# Patient Record
Sex: MALE | Race: WHITE | NOT HISPANIC OR LATINO | Employment: OTHER | URBAN - METROPOLITAN AREA
[De-identification: names, ages, dates, MRNs, and addresses within clinical notes are randomized per-mention and may not be internally consistent; named-entity substitution may affect disease eponyms.]

---

## 2017-05-11 ENCOUNTER — TRANSFERRED RECORDS (OUTPATIENT)
Dept: HEALTH INFORMATION MANAGEMENT | Facility: CLINIC | Age: 47
End: 2017-05-11

## 2017-05-11 LAB
ALT SERPL-CCNC: 44 IU/L (ref 5–40)
AST SERPL-CCNC: 32 U/L (ref 5–34)
CREAT SERPL-MCNC: 0.94 MG/DL (ref 0.5–1.3)
GFR SERPL CREATININE-BSD FRML MDRD: 91.8 ML/MIN/1.73M2

## 2018-02-07 ENCOUNTER — OFFICE VISIT (OUTPATIENT)
Dept: FAMILY MEDICINE | Facility: CLINIC | Age: 48
End: 2018-02-07
Payer: COMMERCIAL

## 2018-02-07 VITALS
WEIGHT: 203.5 LBS | HEART RATE: 76 BPM | BODY MASS INDEX: 26.97 KG/M2 | DIASTOLIC BLOOD PRESSURE: 82 MMHG | SYSTOLIC BLOOD PRESSURE: 120 MMHG | TEMPERATURE: 98 F | OXYGEN SATURATION: 98 % | HEIGHT: 73 IN

## 2018-02-07 DIAGNOSIS — Z00.00 ROUTINE GENERAL MEDICAL EXAMINATION AT A HEALTH CARE FACILITY: Primary | ICD-10-CM

## 2018-02-07 DIAGNOSIS — M1A.9XX1 CHRONIC GOUT WITH TOPHUS, UNSPECIFIED CAUSE, UNSPECIFIED SITE: ICD-10-CM

## 2018-02-07 DIAGNOSIS — Z12.5 PROSTATE CANCER SCREENING: ICD-10-CM

## 2018-02-07 DIAGNOSIS — R09.81 CHRONIC NASAL CONGESTION: ICD-10-CM

## 2018-02-07 LAB
ERYTHROCYTE [DISTWIDTH] IN BLOOD BY AUTOMATED COUNT: 14.9 % (ref 10–15)
HCT VFR BLD AUTO: 43.3 % (ref 40–53)
HGB BLD-MCNC: 14.7 G/DL (ref 13.3–17.7)
MCH RBC QN AUTO: 28.5 PG (ref 26.5–33)
MCHC RBC AUTO-ENTMCNC: 33.9 G/DL (ref 31.5–36.5)
MCV RBC AUTO: 84 FL (ref 78–100)
PLATELET # BLD AUTO: 370 10E9/L (ref 150–450)
RBC # BLD AUTO: 5.15 10E12/L (ref 4.4–5.9)
WBC # BLD AUTO: 5.6 10E9/L (ref 4–11)

## 2018-02-07 PROCEDURE — 84550 ASSAY OF BLOOD/URIC ACID: CPT | Performed by: INTERNAL MEDICINE

## 2018-02-07 PROCEDURE — 80053 COMPREHEN METABOLIC PANEL: CPT | Performed by: INTERNAL MEDICINE

## 2018-02-07 PROCEDURE — G0103 PSA SCREENING: HCPCS | Performed by: INTERNAL MEDICINE

## 2018-02-07 PROCEDURE — 36415 COLL VENOUS BLD VENIPUNCTURE: CPT | Performed by: INTERNAL MEDICINE

## 2018-02-07 PROCEDURE — 80061 LIPID PANEL: CPT | Performed by: INTERNAL MEDICINE

## 2018-02-07 PROCEDURE — 99396 PREV VISIT EST AGE 40-64: CPT | Performed by: INTERNAL MEDICINE

## 2018-02-07 PROCEDURE — 85027 COMPLETE CBC AUTOMATED: CPT | Performed by: INTERNAL MEDICINE

## 2018-02-07 RX ORDER — ALLOPURINOL 300 MG/1
450 TABLET ORAL DAILY
COMMUNITY
End: 2020-06-12

## 2018-02-07 RX ORDER — ASCORBIC ACID 500 MG
TABLET ORAL DAILY
COMMUNITY
End: 2021-01-13

## 2018-02-07 RX ORDER — CETIRIZINE HYDROCHLORIDE, PSEUDOEPHEDRINE HYDROCHLORIDE 5; 120 MG/1; MG/1
1 TABLET, FILM COATED, EXTENDED RELEASE ORAL 2 TIMES DAILY
COMMUNITY
End: 2019-07-25

## 2018-02-07 NOTE — MR AVS SNAPSHOT
After Visit Summary   2/7/2018    Jamar Reina    MRN: [id number]           Patient Information     Date Of Birth          1970        Visit Information        Provider Department      2/7/2018 12:30 PM Shiva Pace MD [facility]        Today's Diagnoses     Routine general medical examination at a health care facility    -  1    Chronic gout with tophus, unspecified cause, unspecified site        Prostate cancer screening        Chronic nasal congestion          Care Instructions      Preventive Health Recommendations  Male Ages 40 to 49    Yearly exam:             See your health care provider every year in order to  o   Review health changes.   o   Discuss preventive care.    o   Review your medicines if your doctor has prescribed any.    You should be tested each year for STDs (sexually transmitted diseases) if you re at risk.     Have a cholesterol test every 5 years.     Have a colonoscopy (test for colon cancer) if someone in your family has had colon cancer or polyps before age 50.     After age 45, have a diabetes test (fasting glucose). If you are at risk for diabetes, you should have this test every 3 years.      Talk with your health care provider about whether or not a prostate cancer screening test (PSA) is right for you.    Shots: Get a flu shot each year. Get a tetanus shot every 10 years.     Nutrition:    Eat at least 5 servings of fruits and vegetables daily.     Eat whole-grain bread, whole-wheat pasta and brown rice instead of white grains and rice.     Talk to your provider about Calcium and Vitamin D.     Lifestyle    Exercise for at least 150 minutes a week (30 minutes a day, 5 days a week). This will help you control your weight and prevent disease.     Limit alcohol to one drink per day.     No smoking.     Wear sunscreen to prevent skin cancer.     See your dentist every six months for an exam and cleaning.              Follow-ups after your  "visit        Who to contact     If you have questions or need follow up information about today's clinic visit or your schedule please contact [facility] directly at [phone].  Normal or non-critical lab and imaging results will be communicated to you by MyChart, letter or phone within 4 business days after the clinic has received the results. If you do not hear from us within 7 days, please contact the clinic through Money Mover[employer] or phone. If you have a critical or abnormal lab result, we will notify you by phone as soon as possible.  Submit refill requests through Percolate or call your pharmacy and they will forward the refill request to us. Please allow 3 business days for your refill to be completed.          Additional Information About Your Visit        Money [email or web address] Information     Percolate gives you secure access to your electronic health record. If you see a primary care provider, you can also send messages to your care team and make appointments. If you have questions, please call your primary care clinic.  If you do not have a primary care provider, please call [phone] and they will assist you.        Care EveryWhere ID     This is your Care EveryWhere ID. This could be used by other organizations to access your [place] medical records  [id number]        Your Vitals Were     Pulse Temperature Height Pulse Oximetry BMI (Body Mass Index)       76 98  F (36.7  C) (Oral) 6' 1\" (1.854 m) 98% 26.85 kg/m2        Blood Pressure from Last 3 Encounters:   02/07/18 120/82   10/24/16 118/81   08/25/16 125/84    Weight from Last 3 Encounters:   02/07/18 203 lb 8 oz (92.3 kg)   10/24/16 201 lb (91.2 kg)   08/25/16 200 lb (90.7 kg)              We Performed the Following     CBC with platelets     Comprehensive metabolic panel     Lipid panel reflex to direct LDL Non-fasting     Prostate spec antigen screen     Uric acid          Today's Medication Changes          These changes are accurate as of 2/7/18  " 1:07 PM.  If you have any questions, ask your nurse or doctor.               These medicines have changed or have updated prescriptions.        Dose/Directions    allopurinol 300 MG tablet   Commonly known as:  ZYLOPRIM   This may have changed:  medication strength   Used for:  Chronic gout with tophus, unspecified cause, unspecified site   Changed by:  Shiva Pace MD        Dose:  450 mg   Take 1.5 tablets (450 mg) by mouth daily   Refills:  0       cetirizine-psuedoePHEDrine 5-120 MG per 12 hr tablet   Commonly known as:  zyrTEC-D   This may have changed:    - when to take this  - reasons to take this   Used for:  Chronic nasal congestion   Changed by:  Shiva Pace MD        Dose:  1 tablet   Take 1 tablet by mouth 2 times daily   Refills:  0         Stop taking these medicines if you haven't already. Please contact your care team if you have questions.     pseudoePHEDrine 30 MG tablet   Commonly known as:  SUDAFED   Stopped by:  Shiva Pace MD           ZYRTEC ALLERGY 10 MG tablet   Generic drug:  cetirizine   Stopped by:  Shiva Pace MD                    Primary Care Provider Office Phone # Fax #    Shiva Pace [phone]       [facility] [address] [place] [id number]        Equal Access to Services     [facility] : Hadii aad ku hadasho Soomaali, waaxda luqadaha, qaybta kaalmada adeegyada, waxay jesus manuelin haysarahn evy nair . So [facility] [phone].    ATENCIÓN: Si habla español, tiene a barry disposición servicios gratuitos de asistencia lingüística. Llame al [phone].    We comply with applicable federal civil rights laws and [place] laws. We do not discriminate on the basis of race, color, national origin, age, disability, sex, sexual orientation, or gender identity.            Thank you!     Thank you for choosing [facility]  for your care. Our goal is always to provide you with excellent care. Hearing back from our  patients is one way we can continue to improve our services. Please take a few minutes to complete the written survey that you may receive in the mail after your visit with us. Thank you!             Your Updated Medication List - Protect others around you: Learn how to safely use, store and throw away your medicines at www.disposemymeds.org.          This list is accurate as of 2/7/18  1:07 PM.  Always use your most recent med list.                   Brand Name Dispense Instructions for use Diagnosis    allopurinol 300 MG tablet    ZYLOPRIM     Take 1.5 tablets (450 mg) by mouth daily    Chronic gout with tophus, unspecified cause, unspecified site       ascorbic acid 500 MG tablet    VITAMIN C     Take by mouth daily        Calcium-Magnesium-Zinc 333-133-5 MG Tabs per tablet      Take 1 tablet by mouth daily        cetirizine-psuedoePHEDrine 5-120 MG per 12 hr tablet    zyrTEC-D     Take 1 tablet by mouth 2 times daily    Chronic nasal congestion       cholecalciferol 1000 UNIT tablet    vitamin D3    100 tablet    Take 1 tablet by mouth Daily in winter        MULTIVITAMIN TABS   OR      1 TABLET DAILY        vitamin B complex with vitamin C Tabs tablet    STRESS TAB    100 tablet    Take 1 tablet by mouth daily as needed

## 2018-02-07 NOTE — PROGRESS NOTES
SUBJECTIVE:   CC: Jamar Reina is an 47 year old male who presents for preventative health visit.     Healthy Habits:    Do you get at least three servings of calcium containing foods daily (dairy, green leafy vegetables, etc.)? yes    Amount of exercise or daily activities, outside of work: 6-7 day(s) per week, will usually do some basic exercises at home every morning and also walks about 5 miles each day    Problems taking medications regularly No    Medication side effects: No    Have you had an eye exam in the past two years? yes    Do you see a dentist twice per year? yes    Do you have sleep apnea, excessive snoring or daytime drowsiness?no       Today's PHQ-2 Score:   PHQ-2 ( 1999 Pfizer) 8/25/2016   Q1: Little interest or pleasure in doing things 1   Q2: Feeling down, depressed or hopeless 0   PHQ-2 Score 1       Abuse: Current or Past(Physical, Sexual or Emotional)- No  Do you feel safe in your environment - Yes    Social History   Substance Use Topics     Smoking status: Former Smoker     Types: Cigarettes     Quit date: 4/1/2010     Smokeless tobacco: Former User     Types: Chew     Quit date: 11/1/2008      Comment: quit both 5/2010     Alcohol use 0.0 oz/week     0 Standard drinks or equivalent per week      Comment: 3 drinks per week      If you drink alcohol do you typically have >3 drinks per day or >7 drinks per week? No                      Last PSA: No results found for: PSA    Reviewed orders with patient. Reviewed health maintenance and updated orders accordingly - Yes  Patient Active Problem List   Diagnosis     Allergic rhinitis     High triglycerides     NO SHOW     Chronic gout with tophus, unspecified cause, unspecified site     Past Surgical History:   Procedure Laterality Date     C NONSPECIFIC PROCEDURE  1998    Loose body excursion removal     C NONSPECIFIC PROCEDURE  10/05    [place] teeth removed x 4     C RAD RESEC TONSIL/PILLARS       C REPAIR CRUCIATE LIGAMENT,KNEE  1991     Right knee     VASECTOMY         Social History   Substance Use Topics     Smoking status: Former Smoker     Types: Cigarettes     Quit date: 2010     Smokeless tobacco: Former User     Types: Chew     Quit date: 2008      Comment: quit both 2010     Alcohol use 0.0 oz/week     0 Standard drinks or equivalent per week      Comment: 3 drinks per week     Family History   Problem Relation Age of Onset     Obesity Mother      Hypertension Father      Arthritis Father      RA     GASTROINTESTINAL DISEASE Father      Colon polyps --- not cancer but had part of his colon removed.     Arrhythmia Father      defibrillator or pace maker in place      DIABETES Maternal Grandmother      insulin dependant     HEART DISEASE Maternal Grandfather       of MI at age 87     Cancer - colorectal Maternal Grandfather      at age 80     Allergies Brother      seasonal and animal     Allergies Sister          Current Outpatient Prescriptions   Medication Sig Dispense Refill     Calcium-Magnesium-Zinc 333-133-5 MG TABS per tablet Take 1 tablet by mouth daily       ascorbic acid (VITAMIN C) 500 MG tablet Take by mouth daily       allopurinol (ZYLOPRIM) 300 MG tablet Take 1.5 tablets (450 mg) by mouth daily       cetirizine-psuedoePHEDrine (ZYRTEC-D) 5-120 MG per 12 hr tablet Take 1 tablet by mouth 2 times daily       cholecalciferol (VITAMIN D) 1000 UNIT tablet Take 1 tablet by mouth Daily in winter 100 tablet 12     B Complex Vitamins (VITAMIN B COMPLEX) tablet Take 1 tablet by mouth daily as needed  100 tablet 12     MULTIVITAMIN TABS   OR 1 TABLET DAILY       Allergies   Allergen Reactions     Animal Dander      Flonase [Fluticasone] Other (See Comments)     Nose bleeds     Seasonal Allergies        Reviewed and updated as needed this visit by clinical staff  Tobacco  Meds  Med Hx  Surg Hx  Fam Hx  Soc Hx        Reviewed and updated as needed this visit by Provider  Tobacco  Med Hx  Surg Hx  Fam Hx  Soc Hx  "      Past Medical History:   Diagnosis Date     Allergic rhinitis      Concussion, unspecified 1991    Loss of consciencness     Gout      Other and unspecified hyperlipidemia       Past Surgical History:   Procedure Laterality Date     C NONSPECIFIC PROCEDURE  1998    Loose body excursion removal     C NONSPECIFIC PROCEDURE  10/05    [place] teeth removed x 4     C RAD RESEC TONSIL/PILLARS       C REPAIR CRUCIATE LIGAMENT,KNEE  1991    Right knee     VASECTOMY  5/06       ROS:  A 12 organ systems ROS is negative.    OBJECTIVE:   /82  Pulse 76  Temp 98  F (36.7  C) (Oral)  Ht 6' 1\" (1.854 m)  Wt 203 lb 8 oz (92.3 kg)  SpO2 98%  BMI 26.85 kg/m2  EXAM:  GENERAL: healthy, alert and no distress  EYES: Eyes grossly normal to inspection, PERRL and conjunctivae and sclerae normal  HENT: ear canals and TM's normal, nose and mouth without ulcers or lesions  NECK: no adenopathy, no asymmetry, masses, or scars and thyroid normal to palpation  RESP: lungs clear to auscultation - no rales, rhonchi or wheezes  CV: regular rate and rhythm, normal S1 S2, no S3 or S4, no murmur, click or rub, no peripheral edema and peripheral pulses strong  ABDOMEN: soft, nontender, no hepatosplenomegaly, no masses and bowel sounds normal  RECTAL: normal sphincter tone, no rectal masses, prostate normal size, smooth, nontender without nodules or masses  :  No inguinal hernias or scrotum masses   MS: no gross musculoskeletal defects noted, no edema  SKIN: no suspicious lesions or rashes  NEURO: Normal strength and tone, mentation intact and speech normal  PSYCH: mentation appears normal, affect normal/bright    ASSESSMENT/PLAN:   1. Routine general medical examination at a health care facility    - Comprehensive metabolic panel  - CBC with platelets  - Lipid panel reflex to direct LDL Non-fasting    2. Chronic gout with tophus, unspecified cause, unspecified site    - allopurinol (ZYLOPRIM) 300 MG tablet; Take 1.5 tablets (450 mg) by " "mouth daily  - Uric acid    3. Prostate cancer screening    - Prostate spec antigen screen    4. Chronic nasal congestion    - cetirizine-psuedoePHEDrine (ZYRTEC-D) 5-120 MG per 12 hr tablet; Take 1 tablet by mouth 2 times daily    COUNSELING:  Reviewed preventive health counseling, as reflected in patient instructions  Special attention given to:        Regular exercise       Healthy diet/nutrition       Immunizations       Prostate cancer screening    BP Screening:   Last 3 BP Readings:    BP Readings from Last 3 Encounters:   02/07/18 120/82   10/24/16 118/81   08/25/16 125/84       The following was recommended to the patient:  Re-screen BP within a year and recommended lifestyle modifications   reports that he quit smoking about 7 years ago. His smoking use included Cigarettes. He quit smokeless tobacco use about 9 years ago. His smokeless tobacco use included Chew.    Estimated body mass index is 26.85 kg/(m^2) as calculated from the following:    Height as of this encounter: 6' 1\" (1.854 m).    Weight as of this encounter: 203 lb 8 oz (92.3 kg).   Weight management plan: Discussed healthy diet and exercise guidelines and patient will follow up in 12 months in clinic to re-evaluate.    Counseling Resources:  ATP IV Guidelines  Pooled Cohorts Equation Calculator  FRAX Risk Assessment  ICSI Preventive Guidelines  Dietary Guidelines for Americans, 2010  USDA's MyPlate  ASA Prophylaxis  Lung CA Screening    Shiva Pace MD  [facility]  "

## 2018-02-08 LAB
ALBUMIN SERPL-MCNC: 3.9 G/DL (ref 3.4–5)
ALP SERPL-CCNC: 100 U/L (ref 40–150)
ALT SERPL W P-5'-P-CCNC: 35 U/L (ref 0–70)
ANION GAP SERPL CALCULATED.3IONS-SCNC: 9 MMOL/L (ref 3–14)
AST SERPL W P-5'-P-CCNC: 22 U/L (ref 0–45)
BILIRUB SERPL-MCNC: 0.7 MG/DL (ref 0.2–1.3)
BUN SERPL-MCNC: 15 MG/DL (ref 7–30)
CALCIUM SERPL-MCNC: 8.7 MG/DL (ref 8.5–10.1)
CHLORIDE SERPL-SCNC: 103 MMOL/L (ref 94–109)
CHOLEST SERPL-MCNC: 131 MG/DL
CO2 SERPL-SCNC: 24 MMOL/L (ref 20–32)
CREAT SERPL-MCNC: 0.97 MG/DL (ref 0.66–1.25)
GFR SERPL CREATININE-BSD FRML MDRD: 83 ML/MIN/1.7M2
GLUCOSE SERPL-MCNC: 86 MG/DL (ref 70–99)
HDLC SERPL-MCNC: 26 MG/DL
LDLC SERPL CALC-MCNC: 59 MG/DL
NONHDLC SERPL-MCNC: 105 MG/DL
POTASSIUM SERPL-SCNC: 3.7 MMOL/L (ref 3.4–5.3)
PROT SERPL-MCNC: 6.7 G/DL (ref 6.8–8.8)
PSA SERPL-ACNC: 1.92 UG/L (ref 0–4)
SODIUM SERPL-SCNC: 136 MMOL/L (ref 133–144)
TRIGL SERPL-MCNC: 231 MG/DL
URATE SERPL-MCNC: 5.6 MG/DL (ref 3.5–7.2)

## 2018-02-08 NOTE — PROGRESS NOTES
"The following letter pertains to your most recent diagnostic tests:    -Your prostate specific antigen (PSA) test result returned normal.     -Liver and gallbladder tests are normal for you. (ALT,AST, Alk phos, bilirubin), kidney function is normal for you (Creatinine, GFR), Sodium is normal, Potassium is normal for you, Calcium is normal for you, Glucose (blood sugar) is normal for you.      -Your gout blood test (uric acid) is at your goal of uric acid less than 6.  Having a uric acid less than 6 optimally prevent gout attacks.    -Your total cholesterol is 131 which is at your goal of total cholesterol less than 200.    -Your triglycerides are 231 which are above your goal of triglycerides less than 150, but you were not fasting.      -Your HDL or \"good cholesterol\" is 26 which is below your goal of HDL cholesterol greater than 40.    -Your LDL cholesterol or \"bad cholesterol\" is 59 which is at your goal of LDL cholesterol less than <160.  Your LDL goal is based on your risk factors for artery disease.     -Your complete blood counts including your hemoglobin returned normal for you.       Bottom line:  Your lab results look stable.  Reducing carbohydrates and fats in your diet, losing weight and consuming less alcohol can improve your triglyceride levels. Increasing exercise can improve HDL (\"good cholesterol\") levels.  A good target to shoot for is 30 minutes of aerobic activity at least 4 days per week. We can recheck in one year.        Follow up:  Schedule an appointment for a physical examination with fasting blood tests in one year's time, or return sooner if new questions, symptoms or problems arise.       Sincerely,    Dr. Pace"

## 2018-07-26 ENCOUNTER — OFFICE VISIT (OUTPATIENT)
Dept: FAMILY MEDICINE | Facility: CLINIC | Age: 48
End: 2018-07-26
Payer: COMMERCIAL

## 2018-07-26 VITALS
WEIGHT: 207 LBS | BODY MASS INDEX: 27.43 KG/M2 | SYSTOLIC BLOOD PRESSURE: 114 MMHG | TEMPERATURE: 98.6 F | HEART RATE: 67 BPM | HEIGHT: 73 IN | DIASTOLIC BLOOD PRESSURE: 72 MMHG | OXYGEN SATURATION: 97 %

## 2018-07-26 DIAGNOSIS — G89.29 HEEL PAIN, CHRONIC, RIGHT: ICD-10-CM

## 2018-07-26 DIAGNOSIS — M79.671 HEEL PAIN, CHRONIC, RIGHT: ICD-10-CM

## 2018-07-26 DIAGNOSIS — L29.9 PRURITIC DISORDER: ICD-10-CM

## 2018-07-26 DIAGNOSIS — A63.0 CONDYLOMA ACUMINATUM IN MALE: Primary | ICD-10-CM

## 2018-07-26 PROCEDURE — 99213 OFFICE O/P EST LOW 20 MIN: CPT | Mod: 25 | Performed by: INTERNAL MEDICINE

## 2018-07-26 PROCEDURE — 17110 DESTRUCTION B9 LES UP TO 14: CPT | Performed by: INTERNAL MEDICINE

## 2018-07-26 RX ORDER — FEXOFENADINE HCL 180 MG/1
180 TABLET ORAL 2 TIMES DAILY
COMMUNITY

## 2018-07-26 RX ORDER — IMIQUIMOD 12.5 MG/.25G
CREAM TOPICAL
Qty: 12 PACKET | Refills: 11 | Status: SHIPPED | OUTPATIENT
Start: 2018-07-26

## 2018-07-26 RX ORDER — GABAPENTIN 300 MG/1
300-600 CAPSULE ORAL
Qty: 180 CAPSULE | Refills: 3 | Status: SHIPPED | OUTPATIENT
Start: 2018-07-26 | End: 2019-07-25

## 2018-07-26 NOTE — MR AVS SNAPSHOT
"              After Visit Summary   7/26/2018    Jamar Reina    MRN: [id number]           Patient Information     Date Of Birth          1970        Visit Information        Provider Department      7/26/2018 10:30 AM Shiva Pace MD [facility]        Today's Diagnoses     Condyloma acuminatum in male    -  1    Pruritic disorder        Heel pain, chronic, right           Follow-ups after your visit        Who to contact     If you have questions or need follow up information about today's clinic visit or your schedule please contact [facility] directly at [phone].  Normal or non-critical lab and imaging results will be communicated to you by MyChart, letter or phone within 4 business days after the clinic has received the results. If you do not hear from us within 7 days, please contact the clinic through FlxOnet or phone. If you have a critical or abnormal lab result, we will notify you by phone as soon as possible.  Submit refill requests through [employer] or call your pharmacy and they will forward the refill request to us. Please allow 3 business days for your refill to be completed.          Additional Information About Your Visit        MyChart Information     [employer] gives you secure access to your electronic health record. If you see a primary care provider, you can also send messages to your care team and make appointments. If you have questions, please call your primary care clinic.  If you do not have a primary care provider, please call [phone] and they will assist you.        Care EveryWhere ID     This is your Care EveryWhere ID. This could be used by other organizations to access your [place] medical records  [id number]        Your Vitals Were     Pulse Temperature Height Pulse Oximetry BMI (Body Mass Index)       67 98.6  F (37  C) (Oral) 6' 1\" (1.854 m) 97% 27.31 kg/m2        Blood Pressure from Last 3 Encounters:   07/26/18 114/72   02/07/18 " 120/82   10/24/16 118/81    Weight from Last 3 Encounters:   07/26/18 207 lb (93.9 kg)   02/07/18 203 lb 8 oz (92.3 kg)   10/24/16 201 lb (91.2 kg)              We Performed the Following     DESTRUC BENIGN/PREMAL,1ST LESION [60657]     Treat Benign Wart or Mulloscum Contagiosum or Milia up to 14 lesions (No quantity required)          Today's Medication Changes          These changes are accurate as of 7/26/18 11:18 AM.  If you have any questions, ask your nurse or doctor.               Start taking these medicines.        Dose/Directions    gabapentin 300 MG capsule   Commonly known as:  NEURONTIN   Used for:  Pruritic disorder   Started by:  Shiva Pace MD        Dose:  300-600 mg   Take 1-2 capsules (300-600 mg) by mouth nightly as needed   Quantity:  180 capsule   Refills:  3       imiquimod 5 % cream   Commonly known as:  ALDARA   Used for:  Condyloma acuminatum in male   Started by:  Shiva Pace MD        Apply a small sized amount to warts or molluscum three times weekly at bedtime.   Wash off after 8 hours.   May use for up to 16 weeks.   Quantity:  12 packet   Refills:  11            Where to get your medicines      These medications were sent to [facility] [address], [address]  [address], [facility] [address], [facility] [address]     Phone:  [phone]     gabapentin 300 MG capsule    imiquimod 5 % cream                Primary Care Provider Office Phone # Fax #    Shiva Pace [phone] [address]  [facility] [address]        Equal Access to Services     [facility] AH: Hadii aristeo dalal Somarium, waaxda luqadaha, qaybta kaalmada ashley, amara arias. So [facility] [phone].    ATENCIÓN: Si habla español, tiene a barry disposición servicios gratuitos de asistencia lingüística. Sarah al [phone].    We comply with applicable federal civil rights laws and [place] laws. We do not  discriminate on the basis of race, color, national origin, age, disability, sex, sexual orientation, or gender identity.            Thank you!     Thank you for choosing [facility]  for your care. Our goal is always to provide you with excellent care. Hearing back from our patients is one way we can continue to improve our services. Please take a few minutes to complete the written survey that you may receive in the mail after your visit with us. Thank you!             Your Updated Medication List - Protect others around you: Learn how to safely use, store and throw away your medicines at www.disposemymeds.org.          This list is accurate as of 7/26/18 11:18 AM.  Always use your most recent med list.                   Brand Name Dispense Instructions for use Diagnosis    allopurinol 300 MG tablet    ZYLOPRIM     Take 1.5 tablets (450 mg) by mouth daily    Chronic gout with tophus, unspecified cause, unspecified site       ascorbic acid 500 MG tablet    VITAMIN C     Take by mouth daily        Calcium-Magnesium-Zinc 333-133-5 MG Tabs per tablet      Take 1 tablet by mouth daily        cetirizine-pseudoePHEDrine 5-120 MG per 12 hr tablet    zyrTEC-D     Take 1 tablet by mouth 2 times daily    Chronic nasal congestion       cholecalciferol 1000 UNIT tablet    vitamin D3    100 tablet    Take 1 tablet by mouth Daily in winter        fexofenadine 180 MG tablet    ALLEGRA     Take 180 mg by mouth 2 times daily        gabapentin 300 MG capsule    NEURONTIN    180 capsule    Take 1-2 capsules (300-600 mg) by mouth nightly as needed    Pruritic disorder       imiquimod 5 % cream    ALDARA    12 packet    Apply a small sized amount to warts or molluscum three times weekly at bedtime.   Wash off after 8 hours.   May use for up to 16 weeks.    Condyloma acuminatum in male       MULTIVITAMIN TABS   OR      1 TABLET DAILY        vitamin B complex with vitamin C Tabs tablet    STRESS TAB    100 tablet    Take 1  tablet by mouth daily as needed        ZANTAC 150 MG tablet   Generic drug:  ranitidine      Take 150 mg by mouth 2 times daily

## 2018-07-26 NOTE — PROGRESS NOTES
SUBJECTIVE:   Jamar Reina is a 47 year old male who presents to clinic today for the following health issues:      Musculoskeletal problem/pain      Duration: 6 months    Description  Location: right foot/ heel' tingling pain after long [employer] shifts     Intensity:  3/10    Accompanying signs and symptoms: none    History  Previous similar problem: YES  Previous evaluation:  none    Precipitating or alleviating factors:  Trauma or overuse: YES  Aggravating factors include: none    Therapies tried and outcome: nothing      2.  History of genital warts  New wart on penis present for months not painful  No new sex partners for over one year  Generally gets [facility] for these [facility]  Desires cryotherapy and imiquimod cream prescription which has worked for him in the past    3.  Generalized pruritis   Severe  Present for months   Seeing allergist who recommended zyrtec + allegra +ranitidine, but it is not helping  No associated rash  Itching on all parts of body  Endorses stress    Problem list and histories reviewed & adjusted, as indicated.  Additional history: as documented    Patient Active Problem List   Diagnosis     Allergic rhinitis     High triglycerides     Chronic gout with tophus, unspecified cause, unspecified site     Past Surgical History:   Procedure Laterality Date     C NONSPECIFIC PROCEDURE  1998    Loose body excursion removal     C NONSPECIFIC PROCEDURE  10/05    [place] teeth removed x 4     C RAD RESEC TONSIL/PILLARS       C REPAIR CRUCIATE LIGAMENT,KNEE  1991    Right knee     VASECTOMY  5/06       Social History   Substance Use Topics     Smoking status: Former Smoker     Types: Cigarettes     Quit date: 4/1/2010     Smokeless tobacco: Former User     Types: Chew     Quit date: 11/1/2008      Comment: quit both 5/2010     Alcohol use 0.0 oz/week     0 Standard drinks or equivalent per week      Comment: 3 drinks per week     Family History   Problem Relation Age of Onset      Obesity Mother      Hypertension Father      Arthritis Father      RA     GASTROINTESTINAL DISEASE Father      Colon polyps --- not cancer but had part of his colon removed.     Arrhythmia Father      defibrillator or pace maker in place      Diabetes Maternal Grandmother      insulin dependant     HEART DISEASE Maternal Grandfather       of MI at age 87     Cancer - colorectal Maternal Grandfather      at age 80     Allergies Brother      seasonal and animal     Allergies Sister          Current Outpatient Prescriptions   Medication Sig Dispense Refill     allopurinol (ZYLOPRIM) 300 MG tablet Take 1.5 tablets (450 mg) by mouth daily       ascorbic acid (VITAMIN C) 500 MG tablet Take by mouth daily       B Complex Vitamins (VITAMIN B COMPLEX) tablet Take 1 tablet by mouth daily as needed  100 tablet 12     Calcium-Magnesium-Zinc 333-133-5 MG TABS per tablet Take 1 tablet by mouth daily       cetirizine-psuedoePHEDrine (ZYRTEC-D) 5-120 MG per 12 hr tablet Take 1 tablet by mouth 2 times daily       cholecalciferol (VITAMIN D) 1000 UNIT tablet Take 1 tablet by mouth Daily in winter 100 tablet 12     fexofenadine (ALLEGRA) 180 MG tablet Take 180 mg by mouth 2 times daily       gabapentin (NEURONTIN) 300 MG capsule Take 1-2 capsules (300-600 mg) by mouth nightly as needed 180 capsule 3     imiquimod (ALDARA) 5 % cream Apply a small sized amount to warts or molluscum three times weekly at bedtime.   Wash off after 8 hours.   May use for up to 16 weeks. 12 packet 11     MULTIVITAMIN TABS   OR 1 TABLET DAILY       ranitidine (ZANTAC) 150 MG tablet Take 150 mg by mouth 2 times daily       Allergies   Allergen Reactions     Animal Dander      Flonase [Fluticasone] Other (See Comments)     Nose bleeds     Seasonal Allergies        Reviewed and updated as needed this visit by clinical staff  Allergies       Reviewed and updated as needed this visit by Provider         ROS:  No fevers or chills  No dysuria or urethral  "discharge     OBJECTIVE:     /72 (BP Location: Right arm, Patient Position: Sitting, Cuff Size: Adult Regular)  Pulse 67  Temp 98.6  F (37  C) (Oral)  Ht 6' 1\" (1.854 m)  Wt 207 lb (93.9 kg)  SpO2 97%  BMI 27.31 kg/m2  Body mass index is 27.31 kg/(m^2).  GENERAL: healthy, alert and no distress  MS: No obvious ulcers or warts on right heel, no tenderness to palpation   SKIN: verrucous lesion on shaft of penis consistent with condyloma   PSYCH: Unchanged mildly anxious affect    Diagnostic Test Results:  none     ASSESSMENT/PLAN:       1. Condyloma acuminatum in male      Liquid nitrogen was applied for 10-12 seconds to the skin lesion and the expected blistering or scabbing reaction explained. Do not pick at the area. Patient reminded to expect hypopigmented scars from the procedure. Return if lesion fails to fully resolve.      - imiquimod (ALDARA) 5 % cream; Apply a small sized amount to warts or molluscum three times weekly at bedtime.   Wash off after 8 hours.   May use for up to 16 weeks.  Dispense: 12 packet; Refill: 11  - Treat Benign Wart or Mulloscum Contagiosum or Milia up to 14 lesions (No quantity required)  - DESTRUC BENIGN/PREMAL,1ST LESION [50399]    2. Pruritic disorder  Try gabapentin  Side effects and risks discussed   Do not drive after taking explained to patient who is an [employer]     - gabapentin (NEURONTIN) 300 MG capsule; Take 1-2 capsules (300-600 mg) by mouth nightly as needed  Dispense: 180 capsule; Refill: 3    3. Heel pain, chronic, right  Try silicone insert in shoe  Gabapentin may help with this too       FUTURE APPOINTMENTS:       - Pending symptoms     Shiva Pace MD  [facility]    "

## 2018-09-12 ENCOUNTER — OFFICE VISIT (OUTPATIENT)
Dept: FAMILY MEDICINE | Facility: CLINIC | Age: 48
End: 2018-09-12
Payer: COMMERCIAL

## 2018-09-12 VITALS
OXYGEN SATURATION: 99 % | TEMPERATURE: 98.7 F | HEART RATE: 78 BPM | BODY MASS INDEX: 27.43 KG/M2 | DIASTOLIC BLOOD PRESSURE: 90 MMHG | SYSTOLIC BLOOD PRESSURE: 134 MMHG | WEIGHT: 207 LBS | HEIGHT: 73 IN

## 2018-09-12 DIAGNOSIS — A63.0 CONDYLOMA: ICD-10-CM

## 2018-09-12 DIAGNOSIS — J20.9 ACUTE BRONCHITIS, UNSPECIFIED ORGANISM: Primary | ICD-10-CM

## 2018-09-12 PROCEDURE — 17000 DESTRUCT PREMALG LESION: CPT | Performed by: INTERNAL MEDICINE

## 2018-09-12 PROCEDURE — 99213 OFFICE O/P EST LOW 20 MIN: CPT | Mod: 25 | Performed by: INTERNAL MEDICINE

## 2018-09-12 RX ORDER — AZITHROMYCIN 250 MG/1
TABLET, FILM COATED ORAL
Qty: 6 TABLET | Refills: 0 | Status: SHIPPED | OUTPATIENT
Start: 2018-09-12 | End: 2019-07-25

## 2018-09-12 RX ORDER — METHYLPREDNISOLONE 4 MG
TABLET, DOSE PACK ORAL
Qty: 21 TABLET | Refills: 0 | Status: SHIPPED | OUTPATIENT
Start: 2018-09-12 | End: 2019-07-25

## 2018-09-12 RX ORDER — ALBUTEROL SULFATE 90 UG/1
2 AEROSOL, METERED RESPIRATORY (INHALATION) EVERY 6 HOURS PRN
Qty: 1 INHALER | Refills: 3 | Status: SHIPPED | OUTPATIENT
Start: 2018-09-12 | End: 2019-07-25

## 2018-09-12 NOTE — MR AVS SNAPSHOT
"              After Visit Summary   9/12/2018    Jamar Reina    MRN: [id number]           Patient Information     Date Of Birth          1970        Visit Information        Provider Department      9/12/2018 4:00 PM Shiva Pace MD [facility]        Today's Diagnoses     Acute bronchitis, unspecified organism    -  1    Condyloma           Follow-ups after your visit        Who to contact     If you have questions or need follow up information about today's clinic visit or your schedule please contact [facility] directly at [phone].  Normal or non-critical lab and imaging results will be communicated to you by [employer]dTorch[employer], letter or phone within 4 business days after the clinic has received the results. If you do not hear from us within 7 days, please contact the clinic through [employer] or phone. If you have a critical or abnormal lab result, we will notify you by phone as soon as possible.  Submit refill requests through [employer] or call your pharmacy and they will forward the refill request to us. Please allow 3 business days for your refill to be completed.          Additional Information About Your Visit        MyChart Information     [employer] gives you secure access to your electronic health record. If you see a primary care provider, you can also send messages to your care team and make appointments. If you have questions, please call your primary care clinic.  If you do not have a primary care provider, please call [phone] and they will assist you.        Care EveryWhere ID     This is your Care EveryWhere ID. This could be used by other organizations to access your [facility] medical records  [id number]        Your Vitals Were     Pulse Temperature Height Pulse Oximetry BMI (Body Mass Index)       78 98.7  F (37.1  C) (Tympanic) 6' 1\" (1.854 m) 99% 27.31 kg/m2        Blood Pressure from Last 3 Encounters:   09/12/18 134/90   07/26/18 114/72   02/07/18 120/82    " Weight from Last 3 Encounters:   09/12/18 207 lb (93.9 kg)   07/26/18 207 lb (93.9 kg)   02/07/18 203 lb 8 oz (92.3 kg)              We Performed the Following     DESTRUC BENIGN/PREMAL,1ST LESION [64671]          Today's Medication Changes          These changes are accurate as of 9/12/18  6:14 PM.  If you have any questions, ask your nurse or doctor.               Start taking these medicines.        Dose/Directions    albuterol 108 (90 Base) MCG/ACT inhaler   Commonly known as:  PROAIR HFA/PROVENTIL HFA/VENTOLIN HFA   Used for:  Acute bronchitis, unspecified organism   Started by:  Shiva Pace MD        Dose:  2 puff   Inhale 2 puffs into the lungs every 6 hours as needed for shortness of breath / dyspnea or wheezing   Quantity:  1 Inhaler   Refills:  3       azithromycin 250 MG tablet   Commonly known as:  ZITHROMAX   Used for:  Acute bronchitis, unspecified organism   Started by:  Shiva Pace MD        Two tablets first day, then one tablet daily for four days.   Quantity:  6 tablet   Refills:  0       methylPREDNISolone 4 MG tablet   Commonly known as:  MEDROL DOSEPAK   Used for:  Acute bronchitis, unspecified organism   Started by:  Shiva Pace MD        Follow package instructions   Quantity:  21 tablet   Refills:  0            Where to get your medicines      These medications were sent to [facility], [place] - [address], [address]  [address], [address], [facility] [address]     Phone:  [phone]     albuterol 108 (90 Base) MCG/ACT inhaler    azithromycin 250 MG tablet    methylPREDNISolone 4 MG tablet                Primary Care Provider Office Phone # Fax #    Shiva Pace [phone] [address]  [facility] [address]        Equal Access to Services     ASUNCION CANELA AH: Emili Man, luciano engle, qaybta kaalenzo ramirez, amara arias. So [facility]  [phone].    ATENCIÓN: Si neida jameson, tiene a barry disposición servicios gratuitos de asistencia lingüística. Sarah wang [phone].    We comply with applicable federal civil rights laws and [place] laws. We do not discriminate on the basis of race, color, national origin, age, disability, sex, sexual orientation, or gender identity.            Thank you!     Thank you for choosing [facility]  for your care. Our goal is always to provide you with excellent care. Hearing back from our patients is one way we can continue to improve our services. Please take a few minutes to complete the written survey that you may receive in the mail after your visit with us. Thank you!             Your Updated Medication List - Protect others around you: Learn how to safely use, store and throw away your medicines at www.disposemymeds.org.          This list is accurate as of 9/12/18  6:14 PM.  Always use your most recent med list.                   Brand Name Dispense Instructions for use Diagnosis    albuterol 108 (90 Base) MCG/ACT inhaler    PROAIR HFA/PROVENTIL HFA/VENTOLIN HFA    1 Inhaler    Inhale 2 puffs into the lungs every 6 hours as needed for shortness of breath / dyspnea or wheezing    Acute bronchitis, unspecified organism       allopurinol 300 MG tablet    ZYLOPRIM     Take 1.5 tablets (450 mg) by mouth daily    Chronic gout with tophus, unspecified cause, unspecified site       ascorbic acid 500 MG tablet    VITAMIN C     Take by mouth daily        azithromycin 250 MG tablet    ZITHROMAX    6 tablet    Two tablets first day, then one tablet daily for four days.    Acute bronchitis, unspecified organism       Calcium-Magnesium-Zinc 333-133-5 MG Tabs per tablet      Take 1 tablet by mouth daily        cetirizine-pseudoePHEDrine 5-120 MG per 12 hr tablet    zyrTEC-D     Take 1 tablet by mouth 2 times daily    Chronic nasal congestion       cholecalciferol 1000 UNIT tablet    vitamin D3    100 tablet     Take 1 tablet by mouth Daily in winter        fexofenadine 180 MG tablet    ALLEGRA     Take 180 mg by mouth 2 times daily        gabapentin 300 MG capsule    NEURONTIN    180 capsule    Take 1-2 capsules (300-600 mg) by mouth nightly as needed    Pruritic disorder       imiquimod 5 % cream    ALDARA    12 packet    Apply a small sized amount to warts or molluscum three times weekly at bedtime.   Wash off after 8 hours.   May use for up to 16 weeks.    Condyloma acuminatum in male       methylPREDNISolone 4 MG tablet    MEDROL DOSEPAK    21 tablet    Follow package instructions    Acute bronchitis, unspecified organism       MULTIVITAMIN TABS   OR      1 TABLET DAILY        Pseudoephedrine HCl 30 MG Caps           ranitidine 75 MG tablet    ZANTAC     Take 75 mg by mouth At Bedtime        vitamin B complex with vitamin C Tabs tablet    STRESS TAB    100 tablet    Take 1 tablet by mouth daily as needed

## 2018-09-12 NOTE — PROGRESS NOTES
SUBJECTIVE:   Jamar Reina is a 47 year old male who presents to clinic today for the following health issues:    RESPIRATORY SYMPTOMS    47-year-old male with a history of gout, allergic rhinitis, condyloma acuminata who is a non-smoker and has no known history of respiratory disease presents with a 10 day history of respiratory illness.  He describes cold symptoms that included cough, nasal congestion, sore throat, fever and rhinorrhea.  Those symptoms resolved, but a day or so later he developed a more severe cough that is productive of thick sputum and also associated with feeling feverish without measured temperatures.  He has a history of bronchitis in the past.  Also, cryotherapy for his genital wart several months ago because the wart to decrease in size but it still persists.  He does request another destruction treatment.  He does note that the gabapentin is working very well for his chronic foot pain without side effects.      Problem list and histories reviewed & adjusted, as indicated.  Additional history: as documented    Patient Active Problem List   Diagnosis     Allergic rhinitis     High triglycerides     Chronic gout with tophus, unspecified cause, unspecified site     Past Surgical History:   Procedure Laterality Date     C NONSPECIFIC PROCEDURE  1998    Loose body excursion removal     C NONSPECIFIC PROCEDURE  10/05    [place] teeth removed x 4     C RAD RESEC TONSIL/PILLARS       C REPAIR CRUCIATE LIGAMENT,KNEE  1991    Right knee     VASECTOMY  5/06       Social History   Substance Use Topics     Smoking status: Former Smoker     Types: Cigarettes     Quit date: 4/1/2010     Smokeless tobacco: Former User     Types: Chew     Quit date: 11/1/2008      Comment: quit both 5/2010     Alcohol use 0.0 oz/week     0 Standard drinks or equivalent per week      Comment: 3 drinks per week     Family History   Problem Relation Age of Onset     Obesity Mother      Hypertension Father       Arthritis Father      RA     GASTROINTESTINAL DISEASE Father      Colon polyps --- not cancer but had part of his colon removed.     Arrhythmia Father      defibrillator or pace maker in place      Diabetes Maternal Grandmother      insulin dependant     HEART DISEASE Maternal Grandfather       of MI at age 87     Cancer - colorectal Maternal Grandfather      at age 80     Allergies Brother      seasonal and animal     Allergies Sister          Current Outpatient Prescriptions   Medication Sig Dispense Refill     albuterol (PROAIR HFA/PROVENTIL HFA/VENTOLIN HFA) 108 (90 Base) MCG/ACT inhaler Inhale 2 puffs into the lungs every 6 hours as needed for shortness of breath / dyspnea or wheezing 1 Inhaler 3     allopurinol (ZYLOPRIM) 300 MG tablet Take 1.5 tablets (450 mg) by mouth daily       ascorbic acid (VITAMIN C) 500 MG tablet Take by mouth daily       azithromycin (ZITHROMAX) 250 MG tablet Two tablets first day, then one tablet daily for four days. 6 tablet 0     B Complex Vitamins (VITAMIN B COMPLEX) tablet Take 1 tablet by mouth daily as needed  100 tablet 12     Calcium-Magnesium-Zinc 333-133-5 MG TABS per tablet Take 1 tablet by mouth daily       cholecalciferol (VITAMIN D) 1000 UNIT tablet Take 1 tablet by mouth Daily in winter 100 tablet 12     fexofenadine (ALLEGRA) 180 MG tablet Take 180 mg by mouth 2 times daily       gabapentin (NEURONTIN) 300 MG capsule Take 1-2 capsules (300-600 mg) by mouth nightly as needed 180 capsule 3     imiquimod (ALDARA) 5 % cream Apply a small sized amount to warts or molluscum three times weekly at bedtime.   Wash off after 8 hours.   May use for up to 16 weeks. 12 packet 11     methylPREDNISolone (MEDROL DOSEPAK) 4 MG tablet Follow package instructions 21 tablet 0     MULTIVITAMIN TABS   OR 1 TABLET DAILY       Pseudoephedrine HCl 30 MG CAPS        ranitidine (ZANTAC) 75 MG tablet Take 75 mg by mouth At Bedtime       cetirizine-psuedoePHEDrine (ZYRTEC-D) 5-120 MG per 12  "hr tablet Take 1 tablet by mouth 2 times daily       Allergies   Allergen Reactions     Animal Dander      Flonase [Fluticasone] Other (See Comments)     Nose bleeds     Seasonal Allergies        Reviewed and updated as needed this visit by clinical staff       Reviewed and updated as needed this visit by Provider         ROS:  Pertinent positive negatives reviewed and history of present illness    OBJECTIVE:     /90 (BP Location: Right arm, Cuff Size: Adult Large)  Pulse 78  Temp 98.7  F (37.1  C) (Tympanic)  Ht 6' 1\" (1.854 m)  Wt 207 lb (93.9 kg)  SpO2 99%  BMI 27.31 kg/m2  Body mass index is 27.31 kg/(m^2).  GENERAL: healthy, alert and no distress  NECK: no adenopathy, no asymmetry, masses, or scars and thyroid normal to palpation  RESP: Diffuse wheezing, good air movement, no use of accessory muscles  CV: Heart with regular rate and rhythm.   ABDOMEN: soft, nontender, no hepatosplenomegaly, no masses and bowel sounds normal  MS: no gross musculoskeletal defects noted, no edema  SKIN: Verrucous lesion on the glans of penis appears smaller than previous exam  NEURO: Normal strength and tone, mentation intact and speech normal  PSYCH: mentation appears normal, affect normal/bright    Diagnostic Test Results:  none     ASSESSMENT/PLAN:         1. Acute bronchitis, unspecified organism  Try below, call if symptoms not improved after 10 days, sooner if new symptoms develop or worsen, will consider chest x-ray if that is the case  - azithromycin (ZITHROMAX) 250 MG tablet; Two tablets first day, then one tablet daily for four days.  Dispense: 6 tablet; Refill: 0  - methylPREDNISolone (MEDROL DOSEPAK) 4 MG tablet; Follow package instructions  Dispense: 21 tablet; Refill: 0  - albuterol (PROAIR HFA/PROVENTIL HFA/VENTOLIN HFA) 108 (90 Base) MCG/ACT inhaler; Inhale 2 puffs into the lungs every 6 hours as needed for shortness of breath / dyspnea or wheezing  Dispense: 1 Inhaler; Refill: 3    2. " Condyloma    Liquid nitrogen was applied for 10-12 seconds to the skin lesion and the expected blistering or scabbing reaction explained. Do not pick at the area. Patient reminded to expect hypopigmented scars from the procedure. Return if lesion fails to fully resolve.          Shiva Pace MD  [facility]

## 2019-07-25 ENCOUNTER — OFFICE VISIT (OUTPATIENT)
Dept: FAMILY MEDICINE | Facility: CLINIC | Age: 49
End: 2019-07-25
Payer: COMMERCIAL

## 2019-07-25 VITALS
OXYGEN SATURATION: 97 % | TEMPERATURE: 97.5 F | HEART RATE: 78 BPM | DIASTOLIC BLOOD PRESSURE: 79 MMHG | BODY MASS INDEX: 26.9 KG/M2 | HEIGHT: 73 IN | SYSTOLIC BLOOD PRESSURE: 128 MMHG | WEIGHT: 203 LBS

## 2019-07-25 DIAGNOSIS — Z12.5 PROSTATE CANCER SCREENING: ICD-10-CM

## 2019-07-25 DIAGNOSIS — Z12.11 SCREENING FOR COLON CANCER: ICD-10-CM

## 2019-07-25 DIAGNOSIS — E78.1 HIGH TRIGLYCERIDES: ICD-10-CM

## 2019-07-25 DIAGNOSIS — Z00.00 ROUTINE GENERAL MEDICAL EXAMINATION AT A HEALTH CARE FACILITY: Primary | ICD-10-CM

## 2019-07-25 DIAGNOSIS — M1A.9XX1 CHRONIC GOUT WITH TOPHUS, UNSPECIFIED CAUSE, UNSPECIFIED SITE: ICD-10-CM

## 2019-07-25 PROCEDURE — 99396 PREV VISIT EST AGE 40-64: CPT | Performed by: INTERNAL MEDICINE

## 2019-07-25 RX ORDER — GABAPENTIN 300 MG/1
CAPSULE ORAL
Refills: 10 | COMMUNITY
Start: 2019-06-16 | End: 2020-07-16

## 2019-07-25 RX ORDER — MONTELUKAST SODIUM 10 MG/1
1 TABLET ORAL
Refills: 1 | COMMUNITY
Start: 2019-06-30

## 2019-07-25 ASSESSMENT — MIFFLIN-ST. JEOR: SCORE: 1844.68

## 2019-07-25 NOTE — PROGRESS NOTES
SUBJECTIVE:   CC: Jamar Reina is an 48 year old male who presents for preventative health visit.     Healthy Habits:    Getting at least 3 servings of Calcium per day:  Yes    Bi-annual eye exam:  Yes    Dental care twice a year:  Yes    Sleep apnea or symptoms of sleep apnea:  None    Diet:  Gluten-free/reduced    Frequency of exercise:  4-5 days/week    Duration of exercise:  Other (walks 5-6 miles per week, no cardio)    Taking medications regularly:  Yes    Barriers to taking medications:  None    Medication side effects:  None    PHQ-2 Total Score:    Additional concerns today:  Yes        Today's PHQ-2 Score:   PHQ-2 (  [employer]) 2019   Q1: Little interest or pleasure in doing things 0   Q2: Feeling down, depressed or hopeless 0   PHQ-2 Score 0       Abuse: Current or Past(Physical, Sexual or Emotional)- No  Do you feel safe in your environment? Yes    Social History     Tobacco Use     Smoking status: Former Smoker     Types: Cigarettes     Last attempt to quit: 2010     Years since quittin.3     Smokeless tobacco: Former User     Types: Chew     Quit date: 2008     Tobacco comment: quit both 2010   Substance Use Topics     Alcohol use: Yes     Alcohol/week: 0.0 oz     Comment: 3 drinks per week     If you drink alcohol do you typically have >3 drinks per day or >7 drinks per week? No    Alcohol Use 2019   Prescreen: >3 drinks/day or >7 drinks/week? No       Last PSA:   PSA   Date Value Ref Range Status   2018 1.92 0 - 4 ug/L Final     Comment:     Assay Method:  Chemiluminescence using Siemens Vista analyzer       Reviewed orders with patient. Reviewed health maintenance and updated orders accordingly - Yes  Patient Active Problem List   Diagnosis     Allergic rhinitis     High triglycerides     Chronic gout with tophus, unspecified cause, unspecified site     Past Surgical History:   Procedure Laterality Date     C NONSPECIFIC PROCEDURE      Loose body  excursion removal     C NONSPECIFIC PROCEDURE  10/05    [place] teeth removed x 4     C RAD RESEC TONSIL/PILLARS       C REPAIR CRUCIATE LIGAMENT,KNEE      Right knee     VASECTOMY         Social History     Tobacco Use     Smoking status: Former Smoker     Types: Cigarettes     Last attempt to quit: 2010     Years since quittin.3     Smokeless tobacco: Former User     Types: Chew     Quit date: 2008     Tobacco comment: quit both 2010   Substance Use Topics     Alcohol use: Yes     Alcohol/week: 0.0 oz     Comment: 3 drinks per week     Family History   Problem Relation Age of Onset     Obesity Mother      Hypertension Father      Arthritis Father         RA     Gastrointestinal Disease Father         Colon polyps --- not cancer but had part of his colon removed.     Arrhythmia Father         defibrillator or pace maker in place      Diabetes Maternal Grandmother         insulin dependant     Heart Disease Maternal Grandfather          of MI at age 87     Cancer - colorectal Maternal Grandfather         at age 80     Allergies Brother         seasonal and animal     Allergies Sister          Current Outpatient Medications   Medication Sig Dispense Refill     allopurinol (ZYLOPRIM) 300 MG tablet Take 1.5 tablets (450 mg) by mouth daily       ascorbic acid (VITAMIN C) 500 MG tablet Take by mouth daily       cholecalciferol (VITAMIN D) 1000 UNIT tablet Take 1 tablet by mouth Daily in winter 100 tablet 12     fexofenadine (ALLEGRA) 180 MG tablet Take 180 mg by mouth 2 times daily       gabapentin (NEURONTIN) 300 MG capsule TAKE 1 TO 2 CAPSULES BY MOUTH AT BEDTIME AS NEEDED  10     imiquimod (ALDARA) 5 % cream Apply a small sized amount to warts or molluscum three times weekly at bedtime.   Wash off after 8 hours.   May use for up to 16 weeks. 12 packet 11     montelukast (SINGULAIR) 10 MG tablet Take 1 tablet by mouth  1     MULTIVITAMIN TABS   OR 1 TABLET DAILY       Pseudoephedrine HCl 30 MG  "CAPS        ranitidine (ZANTAC) 75 MG tablet Take 75 mg by mouth At Bedtime       Allergies   Allergen Reactions     Animal Dander      Beta Adrenergic Blockers      Other reaction(s): Other, see comments  Patient is on allergy injections, Beta Blockers contraindicated. If medically necessary, it is OK to prescribe a Beta Blocker, but the allergy injections will need to be discontinued.      Flonase [Fluticasone] Other (See Comments)     Nose bleeds     Seasonal Allergies      Zyrtec [Cetirizine] Hives       Reviewed and updated as needed this visit by clinical staff  Tobacco  Allergies  Meds  Med Hx  Surg Hx  Fam Hx  Soc Hx        Reviewed and updated as needed this visit by Provider  Tobacco  Med Hx  Surg Hx  Fam Hx  Soc Hx       Past Medical History:   Diagnosis Date     Allergic rhinitis      Concussion, unspecified 1991    Loss of consciencness     Gout      Other and unspecified hyperlipidemia       Past Surgical History:   Procedure Laterality Date     C NONSPECIFIC PROCEDURE  1998    Loose body excursion removal     C NONSPECIFIC PROCEDURE  10/05    [place] teeth removed x 4     C RAD RESEC TONSIL/PILLARS       C REPAIR CRUCIATE LIGAMENT,KNEE  1991    Right knee     VASECTOMY  5/06       Review of Systems  A 10 organ systems ROS is negative.     OBJECTIVE:   /79 (BP Location: Right arm, Cuff Size: Adult Large)   Pulse 78   Temp 97.5  F (36.4  C) (Tympanic)   Ht 1.854 m (6' 1\")   Wt 92.1 kg (203 lb)   SpO2 97%   BMI 26.78 kg/m      Physical Exam  GENERAL: healthy, alert and no distress  EYES: Eyes grossly normal to inspection, PERRL and conjunctivae and sclerae normal  HENT: ear canals and TM's normal, nose and mouth without ulcers or lesions  NECK: no adenopathy, no asymmetry, masses, or scars and thyroid normal to palpation  RESP: lungs clear to auscultation - no rales, rhonchi or wheezes  CV: regular rate and rhythm, normal S1 S2, no S3 or S4, no murmur, click or rub, no peripheral " "edema and peripheral pulses strong  ABDOMEN: soft, nontender, no hepatosplenomegaly, no masses and bowel sounds normal  RECTAL: normal sphincter tone, no rectal masses, prostate normal size, smooth, nontender without nodules or masses  MS: no gross musculoskeletal defects noted, no edema  SKIN: no suspicious lesions or rashes  NEURO: Normal strength and tone, mentation intact and speech normal  PSYCH: mentation appears normal, affect normal/bright    Diagnostic Test Results:  Labs pending     ASSESSMENT/PLAN:   1. Routine general medical examination at a health care facility    - Lipid panel reflex to direct LDL Fasting; Future  - Glucose; Future    2. Screening for colon cancer    - Fecal colorectal cancer screen FIT; Future    3. Prostate cancer screening    - Prostate spec antigen screen; Future    4. Chronic gout with tophus, unspecified cause, unspecified site  Stable; he follows with rheumatology     5. High triglycerides  Recheck       COUNSELING:   Reviewed preventive health counseling, as reflected in patient instructions  Special attention given to:        Regular exercise       Healthy diet/nutrition       Immunizations      Vaccines are up todate           Consider Hep C screening for patients born between 1945 and        HIV screeninx in teen years, 1x in adult years, and at intervals if high risk       Colon cancer screening; FIT test       Prostate cancer screening; PSA today     Estimated body mass index is 26.78 kg/m  as calculated from the following:    Height as of this encounter: 1.854 m (6' 1\").    Weight as of this encounter: 92.1 kg (203 lb).     Weight management plan: Discussed healthy diet and exercise guidelines     reports that he quit smoking about 9 years ago. His smoking use included cigarettes. He quit smokeless tobacco use about 10 years ago. His smokeless tobacco use included chew.      Counseling Resources:  ATP IV Guidelines  Pooled Cohorts Equation Calculator  FRAX Risk " Assessment  ICSI Preventive Guidelines  Dietary Guidelines for Americans, 2010  USDA's MyPlate  ASA Prophylaxis  Lung CA Screening    Shiva Pace MD  [facility]

## 2019-08-02 ENCOUNTER — TRANSFERRED RECORDS (OUTPATIENT)
Dept: HEALTH INFORMATION MANAGEMENT | Facility: CLINIC | Age: 49
End: 2019-08-02

## 2019-08-02 DIAGNOSIS — Z12.5 PROSTATE CANCER SCREENING: ICD-10-CM

## 2019-08-02 DIAGNOSIS — Z00.00 ROUTINE GENERAL MEDICAL EXAMINATION AT A HEALTH CARE FACILITY: ICD-10-CM

## 2019-08-02 LAB
CHOLEST SERPL-MCNC: 138 MG/DL
GLUCOSE SERPL-MCNC: 99 MG/DL (ref 70–99)
HDLC SERPL-MCNC: 29 MG/DL
LDLC SERPL CALC-MCNC: 73 MG/DL
NONHDLC SERPL-MCNC: 109 MG/DL
TRIGL SERPL-MCNC: 181 MG/DL

## 2019-08-02 PROCEDURE — 36415 COLL VENOUS BLD VENIPUNCTURE: CPT | Performed by: INTERNAL MEDICINE

## 2019-08-02 PROCEDURE — G0103 PSA SCREENING: HCPCS | Performed by: INTERNAL MEDICINE

## 2019-08-02 PROCEDURE — 82947 ASSAY GLUCOSE BLOOD QUANT: CPT | Performed by: INTERNAL MEDICINE

## 2019-08-02 PROCEDURE — 80061 LIPID PANEL: CPT | Performed by: INTERNAL MEDICINE

## 2019-08-02 NOTE — LETTER
"[facility]  [address]  [address]  [place]  [address]  Tel: [phone]    August 5, 2019    Jamar Reina  [address] [place] [address]        Dear Mr. Reina,    The following letter pertains to your most recent diagnostic tests:    -Your prostate specific antigen (PSA) test result returned normal.     -Your total cholesterol is 138 which is at your goal of total cholesterol less than 200.    -Your triglycerides are 181 which are above your goal of triglycerides less than 150.    -Your HDL or \"good cholesterol\" is 29 which is below your goal of HDL cholesterol greater than 40.    -Your LDL cholesterol or \"bad cholesterol\" is 73 which is at your goal of LDL cholesterol less than <160.  Your LDL goal is based on your risk factors for artery disease.     -Fasting glucose (sugar) is normal.  No diabetes!          Bottom line:  Reducing carbohydrates and fats in your diet, losing weight and consuming less alcohol can improve your triglyceride levels although your triglycerides have improved considerably since last check.  Increasing exercise can improve HDL (\"good cholesterol\") levels.  A good target to shoot for is 30 minutes of aerobic activity at least 4 days per week (you may already be doing this, and if so, keep it up).  Much of HDL is determined by genetics.   We should recheck in one year.        Follow up:  Schedule an appointment for a physical examination with fasting blood tests in one year's time, or return sooner if new questions, symptoms or problems arise.       Sincerely,    Dr. Pace/JOHN      The 10-year ASCVD risk score (Stivenshalom HYLTON Jr., et al., 2013) is: 3%    Values used to calculate the score:      Age: 48 years      Sex: Male      Is Non- : No      Diabetic: No      Tobacco smoker: No      Systolic Blood Pressure: 128 mmHg      Is BP treated: No      HDL Cholesterol: 29 mg/dL      Total Cholesterol: 138 mg/dL        Enclosure: Lab " Results  Results for orders placed or performed in visit on 08/02/19   Glucose   Result Value Ref Range    Glucose 99 70 - 99 mg/dL   Lipid panel reflex to direct LDL Fasting   Result Value Ref Range    Cholesterol 138 <200 mg/dL    Triglycerides 181 (H) <150 mg/dL    HDL Cholesterol 29 (L) >39 mg/dL    LDL Cholesterol Calculated 73 <100 mg/dL    Non HDL Cholesterol 109 <130 mg/dL   Prostate spec antigen screen   Result Value Ref Range    PSA 3.05 0 - 4 ug/L

## 2019-08-03 LAB — PSA SERPL-ACNC: 3.05 UG/L (ref 0–4)

## 2019-08-04 NOTE — RESULT ENCOUNTER NOTE
"The following letter pertains to your most recent diagnostic tests:    -Your prostate specific antigen (PSA) test result returned normal.     -Your total cholesterol is 138 which is at your goal of total cholesterol less than 200.    -Your triglycerides are 181 which are above your goal of triglycerides less than 150.    -Your HDL or \"good cholesterol\" is 29 which is below your goal of HDL cholesterol greater than 40.    -Your LDL cholesterol or \"bad cholesterol\" is 73 which is at your goal of LDL cholesterol less than <160.  Your LDL goal is based on your risk factors for artery disease.     -Fasting glucose (sugar) is normal.  No diabetes!          Bottom line:  Reducing carbohydrates and fats in your diet, losing weight and consuming less alcohol can improve your triglyceride levels although your triglycerides have improved considerably since last check.  Increasing exercise can improve HDL (\"good cholesterol\") levels.  A good target to shoot for is 30 minutes of aerobic activity at least 4 days per week (you may already be doing this, and if so, keep it up).  Much of HDL is determined by genetics.   We should recheck in one year.        Follow up:  Schedule an appointment for a physical examination with fasting blood tests in one year's time, or return sooner if new questions, symptoms or problems arise.       Sincerely,    Dr. Pace      The 10-year ASCVD risk score (Yawkeyshalom HYLTON Jr., et al., 2013) is: 3%    Values used to calculate the score:      Age: 48 years      Sex: Male      Is Non- : No      Diabetic: No      Tobacco smoker: No      Systolic Blood Pressure: 128 mmHg      Is BP treated: No      HDL Cholesterol: 29 mg/dL      Total Cholesterol: 138 mg/dL"

## 2019-09-03 PROCEDURE — 82274 ASSAY TEST FOR BLOOD FECAL: CPT | Performed by: INTERNAL MEDICINE

## 2019-09-08 LAB — HEMOCCULT STL QL IA: NEGATIVE

## 2019-09-09 DIAGNOSIS — Z12.11 SCREENING FOR COLON CANCER: ICD-10-CM

## 2019-09-09 NOTE — LETTER
[facility]  [address] [place] [address]  [address]  [place]  [address]  Tel: [phone]    September 10, 2019    Jamar Reina  [address] [place] [address]        Dear Mr. Reina,    The following letter pertains to your most recent diagnostic tests:    Good news! Your stool test for colon cancer screening is negative.  This should be repeated in one year.      Sincerely,    Dr. Pace/JOHN        Enclosure: Lab Results  Results for orders placed or performed in visit on 09/09/19   Fecal colorectal cancer screen FIT   Result Value Ref Range    Occult Blood Scn FIT Negative NEG^Negative

## 2019-09-10 NOTE — RESULT ENCOUNTER NOTE
The following letter pertains to your most recent diagnostic tests:    Good news! Your stool test for colon cancer screening is negative.  This should be repeated in one year.      Sincerely,    Dr. Pace

## 2020-03-20 ENCOUNTER — VIRTUAL VISIT (OUTPATIENT)
Dept: FAMILY MEDICINE | Facility: OTHER | Age: 50
End: 2020-03-20

## 2020-03-20 NOTE — PROGRESS NOTES
"Date: 2020 08:58:20  Clinician: Marcy Loco  Clinician NPI: [id number]  Patient: Jamar Reina  Patient : 1970  Patient Address: [address] [place], [place] [id number]  Patient Phone: [phone]  Visit Protocol: URI  Patient Summary:  Jamar is a 49 year old ( : 1970 ) male who initiated a Visit for COVID-19 (Coronavirus) evaluation and screening. When asked the question \"Please sign me up to receive news, health information and promotions from [employer].\", Jamar responded \"Yes\".    Jamar states his symptoms started gradually 3-6 days ago.   His symptoms consist of a headache, ear pain, rhinitis, a sore throat, a cough, nasal congestion, malaise, and chills. He is experiencing mild difficulty breathing with activities but can speak normally in full sentences.   Symptom details     Nasal secretions: The color of his mucus is clear.    Cough: Jamar coughs a few times an hour and his cough is not more bothersome at night. Phlegm does not come into his throat when he coughs. He does not believe his cough is caused by post-nasal drip.     Sore throat: Jamar reports having mild throat pain (1-3 on a 10 point pain scale), does not have exudate on his tonsils, and can swallow liquids. He is not sure if the lymph nodes in his neck are enlarged. A rash has not appeared on the skin since the sore throat started.     Headache: He states the headache is mild (1-3 on a 10 point pain scale).      Jamar denies having fever, facial pain or pressure, myalgias, wheezing, and teeth pain. He also denies having recent facial or sinus surgery in the past 60 days, double sickening (worsening symptoms after initial improvement), and taking antibiotic medication for the symptoms.   Precipitating events  Jamar is not sure if he has been exposed to someone with strep throat. He has recently been exposed to someone with influenza. Jamar has been in close contact with the following high risk individuals: " people with asthma, heart disease or diabetes.   Pertinent COVID-19 (Coronavirus) information  Jamar has not traveled internationally or to the areas where COVID-19 (Coronavirus) is widespread, including cruise ship travel in the last 14 days before the start of his symptoms.   Jamar has not had a close contact with a laboratory-confirmed COVID-19 patient within 14 days of symptom onset. He has had a close contact with a suspected COVID-19 patient within 14 days of symptom onset. Additional information about contact with COVID-19 (Coronavirus) patient as reported by the patient (free text): I am an [employer] .  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  It could be just a col, flu, or coven or anything really.   Jamar is not a healthcare worker and does not work in a healthcare facility.   Pertinent medical history  Jamar does not need a return to work/school note.   Weight: 215 lbs   Jamar does not smoke or use smokeless tobacco.   Additional information as reported by the patient (free text): My chest feels tight, with growing pain in certain spots.  Maybe a 1-2 on a pain scale.  My cough is very sporadic, and usually happens when I try to lie down.  Otherwise coughing is not a real issue, but it is dry, and my esophagus feels sore.   Weight: 215 lbs    MEDICATIONS: montelukast oral, famotidine oral, allopurinol oral, fexofenadine oral, pseudoephedrine-guaifenesin oral, ALLERGIES: Zyrtec  Clinician Response:  Dear Jamar,   Based on the information you have provided, you do have symptoms that are consistent with Coronavirus (COVID-19).  The coronavirus causes mild to severe respiratory illness with the most common symptoms including fever, cough and difficulty breathing. Unfortunately, many viruses cause similar symptoms and it can be difficult to distinguish between viruses, especially in mild cases, so we are presuming that anyone with cough or fever has  coronavirus at this time.  Coronavirus/COVID-19 has reached the point of community spread in [place], meaning that we are finding the virus in people with no known exposure risk for luis the virus. Given the increasing commonness of coronavirus in the community we are no longer testing patients who are not critically ill.  If you are a health care worker, you should refer to your employee health office for instructions about returning to work.  For everyone else who has cough or fever, you should assume you are infected with coronavirus. Accordingly, you should self-quarantine for seven days from the first day your symptoms started OR 72 hours after your cough and fever completely resolve - WHICHEVER is LONGER. You should call if you find increasing shortness of breath, wheezing or sustained fever above 101.5. If you are significantly short of breath or experience chest pain you should call 911 or report to the nearest emergency department for urgent evaluation.    Isolate yourself at home.   Do Not allow any visitors  Do Not go to work or school  Do Not go to Worship,  centers, shopping, or other public places.  Do Not shake hands.  Avoid close contact with others (hugging, kissing).   Protect Others:    Cover Your Mouth and Nose with a mask, disposable tissue or wash cloth to avoid spreading germs to others.  Wash your hands and face frequently with soap and water.   Managing Symptoms:    At this time, we primarily recommend Tylenol (Acetaminophen) for fever or pain. If you have liver or kidney problems, contact your primary care provider for instructions on use of tylenol. Adults can take 650 mg (two 325 mg pills) by mouth every 4-6 hours as needed OR 1,000 mg (two 500 mg pills) every 8 hours as needed. MAXIMUM DAILY DOSE: 3,000mg. For children, refer to dosing on bottle based on age or weight.   If you develop significant shortness of breath that prevents you from doing normal activities,  please call 911 or proceed to the nearest emergency room and alert them immediately that you have been in self-isolation for possible coronavirus.   For more information about COVID19 and options for caring for yourself at home, please visit the CDC website at https://www.cdc.gov/coronavirus/2019-ncov/about/steps-when-sick.htmlFor more options for care at [facility], please visit our website at [email or web address]     Diagnosis: Cough  Diagnosis ICD: R05

## 2020-06-12 ENCOUNTER — VIRTUAL VISIT (OUTPATIENT)
Dept: FAMILY MEDICINE | Facility: CLINIC | Age: 50
End: 2020-06-12
Payer: COMMERCIAL

## 2020-06-12 DIAGNOSIS — M1A.9XX1 CHRONIC GOUT WITH TOPHUS, UNSPECIFIED CAUSE, UNSPECIFIED SITE: Primary | ICD-10-CM

## 2020-06-12 DIAGNOSIS — G62.9 PERIPHERAL POLYNEUROPATHY: ICD-10-CM

## 2020-06-12 DIAGNOSIS — R73.01 IMPAIRED FASTING GLUCOSE: ICD-10-CM

## 2020-06-12 PROCEDURE — 99214 OFFICE O/P EST MOD 30 MIN: CPT | Mod: 95 | Performed by: INTERNAL MEDICINE

## 2020-06-12 RX ORDER — ALLOPURINOL 300 MG/1
450 TABLET ORAL DAILY
Qty: 135 TABLET | Refills: 3 | Status: SHIPPED | OUTPATIENT
Start: 2020-06-12 | End: 2021-06-17

## 2020-06-12 NOTE — PROGRESS NOTES
"Jamar Reina is a 49 year old male who is being evaluated via a billable video visit.      The patient has been notified of following:     \"This video visit will be conducted via a call between you and your physician/provider. We have found that certain health care needs can be provided without the need for an in-person physical exam.  This service lets us provide the care you need with a video conversation.  If a prescription is necessary we can send it directly to your pharmacy.  If lab work is needed we can place an order for that and you can then stop by our lab to have the test done at a later time.    Video visits are billed at different rates depending on your insurance coverage.  Please reach out to your insurance provider with any questions.    If during the course of the call the physician/provider feels a video visit is not appropriate, you will not be charged for this service.\"    Patient has given verbal consent for Video visit? Yes    Will anyone else be joining your video visit? No    Subjective     Jamar Reina is a 49 year old male who presents today via video visit for the following health issues:    HPI         Video Start Time: 1:12 PM    49 year old with gout, allergic rhinitis, high triglycerides   He drinks 1 drink per month   He is overdue for a tetanus shot and called the clinic asking for one  He was directed to a video visit  He also inquires about a 2 month history of tingling and burning pain that is mild in his hands and feet   He wonders if it may be COVID related although he has no current fevers or respiratory symptoms   His gout has been under very good control without an attack for over one year by his report  He wonders if he really needs to continue seeing a rheumatologist?  His urate level was 3.7 last August    Patient Active Problem List   Diagnosis     Allergic rhinitis     High triglycerides     Chronic gout with tophus, unspecified cause, unspecified site "     Past Surgical History:   Procedure Laterality Date     C NONSPECIFIC PROCEDURE      Loose body excursion removal     C NONSPECIFIC PROCEDURE  10/05    [place] teeth removed x 4     C RAD RESEC TONSIL/PILLARS       C REPAIR CRUCIATE LIGAMENT,KNEE      Right knee     VASECTOMY         Social History     Tobacco Use     Smoking status: Former Smoker     Types: Cigarettes     Last attempt to quit: 2010     Years since quitting: 10.2     Smokeless tobacco: Former User     Types: Chew     Quit date: 2008     Tobacco comment: quit both 2010   Substance Use Topics     Alcohol use: Yes     Alcohol/week: 0.0 standard drinks     Comment: 3 drinks per week     Family History   Problem Relation Age of Onset     Obesity Mother      Hypertension Father      Arthritis Father         RA     Gastrointestinal Disease Father         Colon polyps --- not cancer but had part of his colon removed.     Arrhythmia Father         defibrillator or pace maker in place      Diabetes Maternal Grandmother         insulin dependant     Heart Disease Maternal Grandfather          of MI at age 87     Cancer - colorectal Maternal Grandfather         at age 80     Allergies Brother         seasonal and animal     Allergies Sister          Current Outpatient Medications   Medication Sig Dispense Refill     allopurinol (ZYLOPRIM) 300 MG tablet Take 1.5 tablets (450 mg) by mouth daily 135 tablet 3     ascorbic acid (VITAMIN C) 500 MG tablet Take by mouth daily       cholecalciferol (VITAMIN D) 1000 UNIT tablet Take 1 tablet by mouth Daily in winter 100 tablet 12     fexofenadine (ALLEGRA) 180 MG tablet Take 180 mg by mouth 2 times daily       gabapentin (NEURONTIN) 300 MG capsule TAKE 1 TO 2 CAPSULES BY MOUTH AT BEDTIME AS NEEDED  10     imiquimod (ALDARA) 5 % cream Apply a small sized amount to warts or molluscum three times weekly at bedtime.   Wash off after 8 hours.   May use for up to 16 weeks. 12 packet 11      "montelukast (SINGULAIR) 10 MG tablet Take 1 tablet by mouth  1     MULTIVITAMIN TABS   OR 1 TABLET DAILY       Pseudoephedrine HCl 30 MG CAPS        ranitidine (ZANTAC) 75 MG tablet Take 75 mg by mouth At Bedtime       Allergies   Allergen Reactions     Animal Dander      Beta Adrenergic Blockers      Other reaction(s): Other, see comments  Patient is on allergy injections, Beta Blockers contraindicated. If medically necessary, it is OK to prescribe a Beta Blocker, but the allergy injections will need to be discontinued.      Flonase [Fluticasone] Other (See Comments)     Nose bleeds     Seasonal Allergies      Zyrtec [Cetirizine] Hives       Reviewed and updated as needed this visit by Provider         Review of Systems   Constitutional, HEENT, cardiovascular, pulmonary, gi and gu systems are negative, except as otherwise noted.      Objective    There were no vitals taken for this visit.  Estimated body mass index is 26.78 kg/m  as calculated from the following:    Height as of 7/25/19: 1.854 m (6' 1\").    Weight as of 7/25/19: 92.1 kg (203 lb).  Physical Exam     GENERAL: Healthy, alert and no distress  EYES: Eyes grossly normal to inspection.  No discharge or erythema, or obvious scleral/conjunctival abnormalities.  RESP: No audible wheeze, cough, or visible cyanosis.  No visible retractions or increased work of breathing.    SKIN: Visible skin clear. No significant rash, abnormal pigmentation or lesions.  NEURO: Cranial nerves grossly intact.  Mentation and speech appropriate for age.  PSYCH: Mentation appears normal, affect normal/bright, judgement and insight intact, normal speech and appearance well-groomed.      Diagnostic Test Results:  Labs reviewed in Epic        Assessment & Plan     1. Chronic gout with tophus, unspecified cause, unspecified site  Stable   Continue allopurinol check urate with upcoming labs  - allopurinol (ZYLOPRIM) 300 MG tablet; Take 1.5 tablets (450 mg) by mouth daily  Dispense: 135 " tablet; Refill: 3    2. Peripheral polyneuropathy  This sounds like peripheral neuropathy  Check for treatable or dangerous causes  Symptoms are current mild  Consider treatments if symptoms become less tolerable  Consider EMG to confirm if that is the case  Discussed with patient in detail   - HIV Antigen Antibody Combo; Future  - Vitamin B12; Future  - TSH with free T4 reflex; Future  - Lyme Disease Barbara with reflex to WB Serum; Future  - Protein electrophoresis; Future  - Treponema Abs w Reflex to RPR and Titer; Future  - Comprehensive metabolic panel; Future  - CBC with platelets; Future  - **A1C FUTURE anytime; Future                 Return in about 1 week (around 6/19/2020) for Non-fasting Lab Only Visit.    Shiva Pace MD  [facility]      Video-Visit Details    Type of service:  Video Visit    Video End Time:1:43 PM    Originating Location (pt. Location): Home    Distant Location (provider location):  [facility]     Platform used for Video Visit: Doximity    Return in about 1 week (around 6/19/2020) for Non-fasting Lab Only Visit. and MA appointment for tetanus booster       Shiva Pace MD

## 2020-06-15 DIAGNOSIS — R73.01 IMPAIRED FASTING GLUCOSE: ICD-10-CM

## 2020-06-15 DIAGNOSIS — G62.9 PERIPHERAL POLYNEUROPATHY: ICD-10-CM

## 2020-06-15 LAB
ERYTHROCYTE [DISTWIDTH] IN BLOOD BY AUTOMATED COUNT: 13.3 % (ref 10–15)
HBA1C MFR BLD: 5.4 % (ref 0–5.6)
HCT VFR BLD AUTO: 47.8 % (ref 40–53)
HGB BLD-MCNC: 17 G/DL (ref 13.3–17.7)
MCH RBC QN AUTO: 31.8 PG (ref 26.5–33)
MCHC RBC AUTO-ENTMCNC: 35.6 G/DL (ref 31.5–36.5)
MCV RBC AUTO: 90 FL (ref 78–100)
PLATELET # BLD AUTO: 301 10E9/L (ref 150–450)
RBC # BLD AUTO: 5.34 10E12/L (ref 4.4–5.9)
VIT B12 SERPL-MCNC: 549 PG/ML (ref 193–986)
WBC # BLD AUTO: 6.3 10E9/L (ref 4–11)

## 2020-06-15 PROCEDURE — 00000402 ZZHCL STATISTIC TOTAL PROTEIN: Performed by: INTERNAL MEDICINE

## 2020-06-15 PROCEDURE — 36415 COLL VENOUS BLD VENIPUNCTURE: CPT | Performed by: INTERNAL MEDICINE

## 2020-06-15 PROCEDURE — 84443 ASSAY THYROID STIM HORMONE: CPT | Performed by: INTERNAL MEDICINE

## 2020-06-15 PROCEDURE — 84165 PROTEIN E-PHORESIS SERUM: CPT | Performed by: INTERNAL MEDICINE

## 2020-06-15 PROCEDURE — 82607 VITAMIN B-12: CPT | Performed by: INTERNAL MEDICINE

## 2020-06-15 PROCEDURE — 80053 COMPREHEN METABOLIC PANEL: CPT | Performed by: INTERNAL MEDICINE

## 2020-06-15 PROCEDURE — 85027 COMPLETE CBC AUTOMATED: CPT | Performed by: INTERNAL MEDICINE

## 2020-06-15 PROCEDURE — 86618 LYME DISEASE ANTIBODY: CPT | Performed by: INTERNAL MEDICINE

## 2020-06-15 PROCEDURE — 86780 TREPONEMA PALLIDUM: CPT | Performed by: INTERNAL MEDICINE

## 2020-06-15 PROCEDURE — 87389 HIV-1 AG W/HIV-1&-2 AB AG IA: CPT | Performed by: INTERNAL MEDICINE

## 2020-06-15 PROCEDURE — 84550 ASSAY OF BLOOD/URIC ACID: CPT | Performed by: INTERNAL MEDICINE

## 2020-06-15 PROCEDURE — 83036 HEMOGLOBIN GLYCOSYLATED A1C: CPT | Performed by: INTERNAL MEDICINE

## 2020-06-15 NOTE — LETTER
June 16, 2020      Jamar Reina  [address] [facility] [address]        Dear ,    We are writing to inform you of your test results.    These lab results do not show any dangerous or easily treatable causes for your neuropathy symptoms.  We checked for infections such as HIV, Syphilis.  We checked for blood problems and vitamin deficiencies.   We checked for diabetes and thyroid problems.       It is commonly the case where no identifiable cause for neuropathy symptoms are found.  If symptoms become less tolerable, we can discuss medications to mitigate symptoms or additional diagnostic tests to characterize the problem.     The uric acid level is well controlled on you current dose of allopurinol     Resulted Orders   **A1C FUTURE anytime   Result Value Ref Range    Hemoglobin A1C 5.4 0 - 5.6 %      Comment:      Normal <5.7% Prediabetes 5.7-6.4%  Diabetes 6.5% or higher - adopted from ADA   consensus guidelines.     Uric acid   Result Value Ref Range    Uric Acid 4.1 3.5 - 7.2 mg/dL   CBC with platelets   Result Value Ref Range    WBC 6.3 4.0 - 11.0 10e9/L    RBC Count 5.34 4.4 - 5.9 10e12/L    Hemoglobin 17.0 13.3 - 17.7 g/dL    Hematocrit 47.8 40.0 - 53.0 %    MCV 90 78 - 100 fl    MCH 31.8 26.5 - 33.0 pg    MCHC 35.6 31.5 - 36.5 g/dL    RDW 13.3 10.0 - 15.0 %    Platelet Count 301 150 - 450 10e9/L   Comprehensive metabolic panel   Result Value Ref Range    Sodium 137 133 - 144 mmol/L    Potassium 4.0 3.4 - 5.3 mmol/L    Chloride 106 94 - 109 mmol/L    Carbon Dioxide 25 20 - 32 mmol/L    Anion Gap 6 3 - 14 mmol/L    Glucose 93 70 - 99 mg/dL    Urea Nitrogen 13 7 - 30 mg/dL    Creatinine 0.98 0.66 - 1.25 mg/dL    GFR Estimate 90 >60 mL/min/[1.73_m2]      Comment:      Non  GFR Calc  Starting 12/18/2018, serum creatinine based estimated GFR (eGFR) will be   calculated using the Chronic Kidney Disease Epidemiology Collaboration   (CKD-EPI) equation.      GFR Estimate If  Black >90 >60 mL/min/[1.73_m2]      Comment:       GFR Calc  Starting 12/18/2018, serum creatinine based estimated GFR (eGFR) will be   calculated using the Chronic Kidney Disease Epidemiology Collaboration   (CKD-EPI) equation.      Calcium 9.0 8.5 - 10.1 mg/dL    Bilirubin Total 0.8 0.2 - 1.3 mg/dL    Albumin 4.1 3.4 - 5.0 g/dL    Protein Total 7.3 6.8 - 8.8 g/dL    Alkaline Phosphatase 69 40 - 150 U/L    ALT 28 0 - 70 U/L    AST 14 0 - 45 U/L   Treponema Abs w Reflex to RPR and Titer   Result Value Ref Range    Treponema Antibodies Nonreactive NR^Nonreactive      Comment:      Methodology Change: Test performed on the [employer] XL by Treponema   pallidum Total Antibodies Assay as of 3.17.2020.     Protein electrophoresis   Result Value Ref Range    Albumin Fraction 4.5 3.7 - 5.1 g/dL    Alpha 1 Fraction 0.3 0.2 - 0.4 g/dL    Alpha 2 Fraction 0.6 0.5 - 0.9 g/dL    Beta Fraction 0.7 0.6 - 1.0 g/dL    Gamma Fraction 0.7 0.7 - 1.6 g/dL    Monoclonal Peak 0.0 0.0 g/dL    ELP Interpretation:       Essentially normal electrophoretic pattern.  No obvious monoclonal protein seen.    Pathologic significance requires clinical correlation.  ANGELICA Rubio M.D., Ph.D.,   Pathologist ().     Lyme Disease Barbara with reflex to WB Serum   Result Value Ref Range    Lyme Disease Antibodies Serum 0.07 0.00 - 0.89      Comment:      Negative, Absence of detectable Borrelia burdorferi antibodies. A negative   result does not exclude the possibility of Borrelia burgdorferi infection. If   early Lyme disease is suspected, a second sample should be collected and   tested 2 to 4 weeks later.     TSH with free T4 reflex   Result Value Ref Range    TSH 1.51 0.40 - 4.00 mU/L   Vitamin B12   Result Value Ref Range    Vitamin B12 549 193 - 986 pg/mL   HIV Antigen Antibody Combo   Result Value Ref Range    HIV Antigen Antibody Combo Nonreactive NR^Nonreactive          Comment:      HIV-1 p24 Ag & HIV-1/HIV-2 Ab  Not Detected       If you have any questions or concerns, please call the clinic at the number listed above.       Sincerely,      Dr. Sanjeev MD/ jr ma

## 2020-06-16 ENCOUNTER — ALLIED HEALTH/NURSE VISIT (OUTPATIENT)
Dept: NURSING | Facility: CLINIC | Age: 50
End: 2020-06-16
Payer: COMMERCIAL

## 2020-06-16 DIAGNOSIS — Z23 NEED FOR VACCINATION: Primary | ICD-10-CM

## 2020-06-16 LAB
ALBUMIN SERPL ELPH-MCNC: 4.5 G/DL (ref 3.7–5.1)
ALBUMIN SERPL-MCNC: 4.1 G/DL (ref 3.4–5)
ALP SERPL-CCNC: 69 U/L (ref 40–150)
ALPHA1 GLOB SERPL ELPH-MCNC: 0.3 G/DL (ref 0.2–0.4)
ALPHA2 GLOB SERPL ELPH-MCNC: 0.6 G/DL (ref 0.5–0.9)
ALT SERPL W P-5'-P-CCNC: 28 U/L (ref 0–70)
ANION GAP SERPL CALCULATED.3IONS-SCNC: 6 MMOL/L (ref 3–14)
AST SERPL W P-5'-P-CCNC: 14 U/L (ref 0–45)
B BURGDOR IGG+IGM SER QL: 0.07 (ref 0–0.89)
B-GLOBULIN SERPL ELPH-MCNC: 0.7 G/DL (ref 0.6–1)
BILIRUB SERPL-MCNC: 0.8 MG/DL (ref 0.2–1.3)
BUN SERPL-MCNC: 13 MG/DL (ref 7–30)
CALCIUM SERPL-MCNC: 9 MG/DL (ref 8.5–10.1)
CHLORIDE SERPL-SCNC: 106 MMOL/L (ref 94–109)
CO2 SERPL-SCNC: 25 MMOL/L (ref 20–32)
CREAT SERPL-MCNC: 0.98 MG/DL (ref 0.66–1.25)
GAMMA GLOB SERPL ELPH-MCNC: 0.7 G/DL (ref 0.7–1.6)
GFR SERPL CREATININE-BSD FRML MDRD: 90 ML/MIN/{1.73_M2}
GLUCOSE SERPL-MCNC: 93 MG/DL (ref 70–99)
HIV 1+2 AB+HIV1 P24 AG SERPL QL IA: NONREACTIVE
M PROTEIN SERPL ELPH-MCNC: 0 G/DL
POTASSIUM SERPL-SCNC: 4 MMOL/L (ref 3.4–5.3)
PROT PATTERN SERPL ELPH-IMP: NORMAL
PROT SERPL-MCNC: 7.3 G/DL (ref 6.8–8.8)
SODIUM SERPL-SCNC: 137 MMOL/L (ref 133–144)
T PALLIDUM AB SER QL: NONREACTIVE
TSH SERPL DL<=0.005 MIU/L-ACNC: 1.51 MU/L (ref 0.4–4)
URATE SERPL-MCNC: 4.1 MG/DL (ref 3.5–7.2)

## 2020-06-16 PROCEDURE — 99207 ZZC NO CHARGE NURSE ONLY: CPT

## 2020-06-16 PROCEDURE — 90714 TD VACC NO PRESV 7 YRS+ IM: CPT

## 2020-06-16 PROCEDURE — 90471 IMMUNIZATION ADMIN: CPT

## 2020-06-16 NOTE — NURSING NOTE
Prior to immunization administration, verified patients identity using patient s name and date of birth. Please see Immunization Activity for additional information.     Screening Questionnaire for Adult Immunization    Are you sick today?   No   Do you have allergies to medications, food, a vaccine component or latex?   No   Have you ever had a serious reaction after receiving a vaccination?   No   Do you have a long-term health problem with heart, lung, kidney, or metabolic disease (e.g., diabetes), asthma, a blood disorder, no spleen, complement component deficiency, a cochlear implant, or a spinal fluid leak?  Are you on long-term aspirin therapy?   No   Do you have cancer, leukemia, HIV/AIDS, or any other immune system problem?   No   Do you have a parent, brother, or sister with an immune system problem?   No   In the past 3 months, have you taken medications that affect  your immune system, such as prednisone, other steroids, or anticancer drugs; drugs for the treatment of rheumatoid arthritis, Crohn s disease, or psoriasis; or have you had radiation treatments?   No   Have you had a seizure, or a brain or other nervous system problem?   No   During the past year, have you received a transfusion of blood or blood    products, or been given immune (gamma) globulin or antiviral drug?   No   For women: Are you pregnant or is there a chance you could become       pregnant during the next month?   No   Have you received any vaccinations in the past 4 weeks?   No     Immunization questionnaire answers were all negative.        Per orders of Dr. Pace, injection of Td given by Soraya Ochoa MA. Patient instructed to remain in clinic for 15 minutes afterwards, and to report any adverse reaction to me immediately.       Screening performed by Soraya Ochoa MA on 6/16/2020 at 12:59 PM.

## 2020-06-16 NOTE — RESULT ENCOUNTER NOTE
The following letter pertains to your most recent diagnostic tests:    These lab results do not show any dangerous or easily treatable causes for your neuropathy symptoms.  We checked for infections such as HIV, Syphilis.  We checked for blood problems and vitamin deficiencies.   We checked for diabetes and thyroid problems.      It is commonly the case where no identifiable cause for neuropathy symptoms are found.  If symptoms become less tolerable, we can discuss medications to mitigate symptoms or additional diagnostic tests to characterize the problem.    The uric acid level is well controlled on you current dose of allopurinol      Sincerely,    Dr. Pace

## 2020-06-25 ENCOUNTER — MYC MEDICAL ADVICE (OUTPATIENT)
Dept: FAMILY MEDICINE | Facility: CLINIC | Age: 50
End: 2020-06-25

## 2020-06-25 DIAGNOSIS — G62.9 PERIPHERAL POLYNEUROPATHY: Primary | ICD-10-CM

## 2020-06-25 NOTE — TELEPHONE ENCOUNTER
It looks like he had his labs as recommended by  after recent virtual visit.     Please advise next step, based on his [facility] message.    Thank you,  Regla Pete RN on 6/25/2020 at 3:19 PM

## 2020-07-16 NOTE — TELEPHONE ENCOUNTER
Pt requesting gabapentin Rx - last Rx 300mg, 1-2 capsule at bedtime as needed #180 with 3 refills 7/26/18    Message sent to verify dosing and pharmacy as it is historical on list     Meghann APONTE RN

## 2020-07-17 RX ORDER — GABAPENTIN 300 MG/1
CAPSULE ORAL
Qty: 180 CAPSULE | Refills: 6 | Status: SHIPPED | OUTPATIENT
Start: 2020-07-17 | End: 2021-08-13

## 2020-07-17 NOTE — TELEPHONE ENCOUNTER
TO PCP:     See below message - pt requesting Gabapentin     Historical on med list for 300mg 1-2 capsules as needed at HS     Per below he previously took for nerve damage on foot, but now he is requesting for peripheral neuropathy    Please advise   Meghann APONTE RN

## 2020-11-22 ENCOUNTER — HEALTH MAINTENANCE LETTER (OUTPATIENT)
Age: 50
End: 2020-11-22

## 2021-01-13 ENCOUNTER — OFFICE VISIT (OUTPATIENT)
Dept: FAMILY MEDICINE | Facility: CLINIC | Age: 51
End: 2021-01-13
Payer: COMMERCIAL

## 2021-01-13 VITALS
HEART RATE: 93 BPM | OXYGEN SATURATION: 95 % | DIASTOLIC BLOOD PRESSURE: 88 MMHG | BODY MASS INDEX: 28.49 KG/M2 | WEIGHT: 215 LBS | SYSTOLIC BLOOD PRESSURE: 134 MMHG | HEIGHT: 73 IN | TEMPERATURE: 97.3 F

## 2021-01-13 DIAGNOSIS — K60.2 RECTAL FISSURE: ICD-10-CM

## 2021-01-13 DIAGNOSIS — K64.4 EXTERNAL HEMORRHOIDS: Primary | ICD-10-CM

## 2021-01-13 DIAGNOSIS — Z12.11 COLON CANCER SCREENING: ICD-10-CM

## 2021-01-13 PROCEDURE — 99213 OFFICE O/P EST LOW 20 MIN: CPT | Performed by: INTERNAL MEDICINE

## 2021-01-13 RX ORDER — HYDROCORTISONE 25 MG/G
CREAM TOPICAL 2 TIMES DAILY PRN
Qty: 30 G | Refills: 1 | Status: SHIPPED | OUTPATIENT
Start: 2021-01-13

## 2021-01-13 RX ORDER — FAMOTIDINE 10 MG
10 TABLET ORAL 2 TIMES DAILY
COMMUNITY

## 2021-01-13 ASSESSMENT — MIFFLIN-ST. JEOR: SCORE: 1889.11

## 2021-01-13 NOTE — PROGRESS NOTES
"  Assessment & Plan   Problem List Items Addressed This Visit     None      Visit Diagnoses     External hemorrhoids    -  Primary    Relevant Medications    hydrocortisone, Perianal, (HYDROCORTISONE) 2.5 % cream    Other Relevant Orders    GENERAL SURG ADULT REFERRAL    Rectal fissure        Relevant Medications    hydrocortisone, Perianal, (HYDROCORTISONE) 2.5 % cream    Other Relevant Orders    GENERAL SURG ADULT REFERRAL    Colon cancer screening        Relevant Orders    Fecal colorectal cancer screen (FIT)         We discussed medical management for hemorrhoids and/or rectal fissure which includes topical steroids with hydrocortisone 2.5% in the form of Anusol cream or suppository; for 2 weeks twice a day in addition to increase fiber and fluids    Can use Metamucil 1 teaspoon in 8 ounces of fluids or water daily in addition to doing SITZ baths on regular basis.  If such medical therapy does not help with the suspected small anal fissure and hemorrhoid then he will need probably surgical intervention.  Referral placed to general surgery.  Patient in agreement with the plan.  Follow-up if any increased rectal pain, rectal bleed, abdominal pain or other concerns.         BMI:   Estimated body mass index is 28.37 kg/m  as calculated from the following:    Height as of this encounter: 1.854 m (6' 1\").    Weight as of this encounter: 97.5 kg (215 lb).   Weight management plan: Discussed healthy diet and exercise guidelines      MEDICATIONS:  Continue current medications without change  CONSULTATION/REFERRAL to General surgery  See Patient Instructions    No follow-ups on file.    Aarti Lovell MD  [facility] VICKEY Roldan is a 50 year old who presents to clinic today for the following health issues     HPI       Hemorrhoids  Onset/Duration: has been going on for about 7-8 months  Description:   Diane-anal lump: no  Pain: YES  Itching: YES  Accompanying Signs & Symptoms:  Blood in " "stool: no  Changes in stool pattern: no  History:   Any previous GI studies done:Colonoscopy  Family History of colon cancer: YES  Precipitating factors:   None  Alleviating factors:  None  Therapies tried and outcome: preparation H    Patient presented for evaluation of suspected hemorrhoid/rectal lump he feels an the anal area.  He describes occasional blood when he wipes.  Denies any blood in the stool or toilet.  Denies any active rectal bleeding.  No pain upon defecation.  No diarrhea or constipation.  No prior history of hemorrhoids.  He does eat a lot of fiber foods in his diet.  No abdominal pain.  No other systemic complaints    Review of Systems   Constitutional, HEENT, cardiovascular, pulmonary, gi and gu systems are negative, except as otherwise noted.      Objective    /88 (BP Location: Left arm, Patient Position: Sitting, Cuff Size: Adult Large)   Pulse 93   Temp 97.3  F (36.3  C) (Temporal)   Ht 1.854 m (6' 1\")   Wt 97.5 kg (215 lb)   SpO2 95%   BMI 28.37 kg/m    Body mass index is 28.37 kg/m .  Physical Exam   GENERAL: healthy, alert and no distress  EYES: Eyes grossly normal to inspection, PERRL and conjunctivae and sclerae normal  ABDOMEN: soft, nontender, no hepatosplenomegaly, no masses and bowel sounds normal  RECTAL: normal sphincter tone, no rectal masses, ~0.5x0.5 cm thrombosed hemorrhoid and superficial anal fissure at 12 o'clock  MS: no gross musculoskeletal defects noted, no edema  SKIN: no suspicious lesions or rashes  NEURO: Normal strength and tone, mentation intact and speech normal    Orders Only on 06/15/2020   Component Date Value Ref Range Status     Hemoglobin A1C 06/15/2020 5.4  0 - 5.6 % Final    Comment: Normal <5.7% Prediabetes 5.7-6.4%  Diabetes 6.5% or higher - adopted from ADA   consensus guidelines.       Uric Acid 06/15/2020 4.1  3.5 - 7.2 mg/dL Final     WBC 06/15/2020 6.3  4.0 - 11.0 10e9/L Final     RBC Count 06/15/2020 5.34  4.4 - 5.9 10e12/L Final     " Hemoglobin 06/15/2020 17.0  13.3 - 17.7 g/dL Final     Hematocrit 06/15/2020 47.8  40.0 - 53.0 % Final     MCV 06/15/2020 90  78 - 100 fl Final     MCH 06/15/2020 31.8  26.5 - 33.0 pg Final     MCHC 06/15/2020 35.6  31.5 - 36.5 g/dL Final     RDW 06/15/2020 13.3  10.0 - 15.0 % Final     Platelet Count 06/15/2020 301  150 - 450 10e9/L Final     Sodium 06/15/2020 137  133 - 144 mmol/L Final     Potassium 06/15/2020 4.0  3.4 - 5.3 mmol/L Final     Chloride 06/15/2020 106  94 - 109 mmol/L Final     Carbon Dioxide 06/15/2020 25  20 - 32 mmol/L Final     Anion Gap 06/15/2020 6  3 - 14 mmol/L Final     Glucose 06/15/2020 93  70 - 99 mg/dL Final     Urea Nitrogen 06/15/2020 13  7 - 30 mg/dL Final     Creatinine 06/15/2020 0.98  0.66 - 1.25 mg/dL Final     GFR Estimate 06/15/2020 90  >60 mL/min/[1.73_m2] Final    Comment: Non  GFR Calc  Starting 12/18/2018, serum creatinine based estimated GFR (eGFR) will be   calculated using the Chronic Kidney Disease Epidemiology Collaboration   (CKD-EPI) equation.       GFR Estimate If Black 06/15/2020 >90  >60 mL/min/[1.73_m2] Final    Comment:  GFR Calc  Starting 12/18/2018, serum creatinine based estimated GFR (eGFR) will be   calculated using the Chronic Kidney Disease Epidemiology Collaboration   (CKD-EPI) equation.       Calcium 06/15/2020 9.0  8.5 - 10.1 mg/dL Final     Bilirubin Total 06/15/2020 0.8  0.2 - 1.3 mg/dL Final     Albumin 06/15/2020 4.1  3.4 - 5.0 g/dL Final     Protein Total 06/15/2020 7.3  6.8 - 8.8 g/dL Final     Alkaline Phosphatase 06/15/2020 69  40 - 150 U/L Final     ALT 06/15/2020 28  0 - 70 U/L Final     AST 06/15/2020 14  0 - 45 U/L Final     Treponema Antibodies 06/15/2020 Nonreactive  NR^Nonreactive Final    Comment: Methodology Change: Test performed on the [employer] XL by Treponema   pallidum Total Antibodies Assay as of 3.17.2020.       Albumin Fraction 06/15/2020 4.5  3.7 - 5.1 g/dL Final     Alpha 1 Fraction  06/15/2020 0.3  0.2 - 0.4 g/dL Final     Alpha 2 Fraction 06/15/2020 0.6  0.5 - 0.9 g/dL Final     Beta Fraction 06/15/2020 0.7  0.6 - 1.0 g/dL Final     Gamma Fraction 06/15/2020 0.7  0.7 - 1.6 g/dL Final     Monoclonal Peak 06/15/2020 0.0  0.0 g/dL Final     ELP Interpretation: 06/15/2020    Final                    Value:Essentially normal electrophoretic pattern.  No obvious monoclonal protein seen.    Pathologic significance requires clinical correlation.  ANGELICA Rubio M.D., Ph.D.,   Pathologist ().       Lyme Disease Antibodies Serum 06/15/2020 0.07  0.00 - 0.89 Final    Comment: Negative, Absence of detectable Borrelia burdorferi antibodies. A negative   result does not exclude the possibility of Borrelia burgdorferi infection. If   early Lyme disease is suspected, a second sample should be collected and   tested 2 to 4 weeks later.       TSH 06/15/2020 1.51  0.40 - 4.00 mU/L Final     Vitamin B12 06/15/2020 549  193 - 986 pg/mL Final     HIV Antigen Antibody Combo 06/15/2020 Nonreactive  NR^Nonreactive     Final    HIV-1 p24 Ag & HIV-1/HIV-2 Ab Not Detected

## 2021-01-21 DIAGNOSIS — Z12.11 COLON CANCER SCREENING: ICD-10-CM

## 2021-01-24 LAB — HEMOCCULT STL QL IA: NORMAL

## 2021-02-17 ENCOUNTER — OFFICE VISIT (OUTPATIENT)
Dept: FAMILY MEDICINE | Facility: CLINIC | Age: 51
End: 2021-02-17
Payer: COMMERCIAL

## 2021-02-17 VITALS
WEIGHT: 215 LBS | BODY MASS INDEX: 28.49 KG/M2 | OXYGEN SATURATION: 98 % | TEMPERATURE: 97.3 F | DIASTOLIC BLOOD PRESSURE: 87 MMHG | SYSTOLIC BLOOD PRESSURE: 130 MMHG | HEART RATE: 76 BPM | HEIGHT: 73 IN

## 2021-02-17 DIAGNOSIS — H57.12 DISCOMFORT OF LEFT EYE: Primary | ICD-10-CM

## 2021-02-17 PROCEDURE — 99212 OFFICE O/P EST SF 10 MIN: CPT | Performed by: NURSE PRACTITIONER

## 2021-02-17 ASSESSMENT — MIFFLIN-ST. JEOR: SCORE: 1889.11

## 2021-02-17 NOTE — PROGRESS NOTES
"    Assessment & Plan   Problem List Items Addressed This Visit     None      Visit Diagnoses     Discomfort of left eye    -  Primary    Relevant Orders    EYE ADULT REFERRAL           With normal physical exam but symptomatic, reccommended further eval either with his eye doctor or through [facility]- referral placed.    INES Pederson CNP  [facility] VICKEY Roldan is a 50 year old who presents for the following health issues     HPI     Chief Complaint   Patient presents with     Eye Problem     left eye for 4 weeks       Noticed a bump on inner left eye 4 weeks ago.   Started as shooting pain when reading.  Noticed it getting bigger  Has ongoing allergies and noticed L eye itching since Oct using zadator  Has had eye infections in the past- used to work as a .  Uncomfortable but no pain, has noticed eye twitching   No changes in vision  No fevers  Wears eye glasses and has an eye doctor. Has not been seen for this.  No other ENT symptoms       Review of Systems   Detailed as above       Objective    /87 (BP Location: Right arm, Patient Position: Sitting, Cuff Size: Adult Large)   Pulse 76   Temp 97.3  F (36.3  C) (Temporal)   Ht 1.854 m (6' 1\")   Wt 97.5 kg (215 lb)   SpO2 98%   BMI 28.37 kg/m    There is no height or weight on file to calculate BMI.  Physical Exam  Constitutional:       Appearance: Normal appearance.   Eyes:      Conjunctiva/sclera: Conjunctivae normal.      Comments: No bump visualized    Neurological:      Mental Status: He is alert and oriented to person, place, and time.   Psychiatric:         Mood and Affect: Mood normal.         Behavior: Behavior normal.            ----- Services Performed by a MEDICAL STUDENT in Presence of ATTENDING Physician-------   Barbara Willett NP student        "

## 2021-06-17 DIAGNOSIS — M1A.9XX1 CHRONIC GOUT WITH TOPHUS, UNSPECIFIED CAUSE, UNSPECIFIED SITE: ICD-10-CM

## 2021-06-17 RX ORDER — ALLOPURINOL 300 MG/1
TABLET ORAL
Qty: 135 TABLET | Refills: 0 | Status: ON HOLD | OUTPATIENT
Start: 2021-06-17 | End: 2021-09-16

## 2021-06-17 NOTE — TELEPHONE ENCOUNTER
Routing refill request to provider for review/approval because:  Labs out of range multiple.     Mariaelena Ramos RN  [facility] Triage

## 2021-08-12 DIAGNOSIS — G62.9 PERIPHERAL POLYNEUROPATHY: ICD-10-CM

## 2021-08-13 RX ORDER — GABAPENTIN 300 MG/1
CAPSULE ORAL
Qty: 180 CAPSULE | Refills: 0 | Status: SHIPPED | OUTPATIENT
Start: 2021-08-13 | End: 2021-08-16

## 2021-08-13 NOTE — TELEPHONE ENCOUNTER
gabapentin (NEURONTIN) 300 MG capsule 180 capsule 6 7/17/2020  No   Sig: TAKE 1 TO 2 CAPSULES BY MOUTH AT BEDTIME AS NEEDED   Sent to pharmacy as: Gabapentin 300 MG Oral Capsule (NEURONTIN)   Class: E-Prescribe   Order: [id number]   E-Prescribing Status: Receipt confirmed by pharmacy (7/17/2020  3:36 PM CDT)       Next 5 appointments (look out 90 days)    Aug 16, 2021  1:30 PM  PHYSICAL with Shiva Pace MD  [facility] ([facility] [address], [address]  [facility] [address]  [phone]      Last visit with PCP 6/12/2020 virtual   Routing refill request to provider for review/approval because:  Drug not on the [facility] refill protocol   Meghann APONTE RN

## 2021-08-16 ENCOUNTER — OFFICE VISIT (OUTPATIENT)
Dept: FAMILY MEDICINE | Facility: CLINIC | Age: 51
End: 2021-08-16
Payer: COMMERCIAL

## 2021-08-16 ENCOUNTER — TELEPHONE (OUTPATIENT)
Dept: FAMILY MEDICINE | Facility: CLINIC | Age: 51
End: 2021-08-16

## 2021-08-16 VITALS
SYSTOLIC BLOOD PRESSURE: 123 MMHG | TEMPERATURE: 97.8 F | HEART RATE: 84 BPM | HEIGHT: 73 IN | OXYGEN SATURATION: 95 % | BODY MASS INDEX: 27.79 KG/M2 | DIASTOLIC BLOOD PRESSURE: 81 MMHG | WEIGHT: 209.7 LBS | RESPIRATION RATE: 12 BRPM

## 2021-08-16 DIAGNOSIS — Z12.11 COLON CANCER SCREENING: ICD-10-CM

## 2021-08-16 DIAGNOSIS — M1A.9XX1 CHRONIC GOUT WITH TOPHUS, UNSPECIFIED CAUSE, UNSPECIFIED SITE: ICD-10-CM

## 2021-08-16 DIAGNOSIS — G62.9 PERIPHERAL POLYNEUROPATHY: ICD-10-CM

## 2021-08-16 DIAGNOSIS — E78.1 HIGH TRIGLYCERIDES: ICD-10-CM

## 2021-08-16 DIAGNOSIS — F43.23 ADJUSTMENT DISORDER WITH MIXED ANXIETY AND DEPRESSED MOOD: ICD-10-CM

## 2021-08-16 DIAGNOSIS — Z00.00 ROUTINE GENERAL MEDICAL EXAMINATION AT A HEALTH CARE FACILITY: Primary | ICD-10-CM

## 2021-08-16 LAB
ERYTHROCYTE [DISTWIDTH] IN BLOOD BY AUTOMATED COUNT: 13.3 % (ref 10–15)
HCT VFR BLD AUTO: 47.9 % (ref 40–53)
HGB BLD-MCNC: 17.1 G/DL (ref 13.3–17.7)
MCH RBC QN AUTO: 32 PG (ref 26.5–33)
MCHC RBC AUTO-ENTMCNC: 35.7 G/DL (ref 31.5–36.5)
MCV RBC AUTO: 90 FL (ref 78–100)
PLATELET # BLD AUTO: 276 10E3/UL (ref 150–450)
RBC # BLD AUTO: 5.34 10E6/UL (ref 4.4–5.9)
WBC # BLD AUTO: 6 10E3/UL (ref 4–11)

## 2021-08-16 PROCEDURE — 84550 ASSAY OF BLOOD/URIC ACID: CPT | Performed by: INTERNAL MEDICINE

## 2021-08-16 PROCEDURE — 99396 PREV VISIT EST AGE 40-64: CPT | Performed by: INTERNAL MEDICINE

## 2021-08-16 PROCEDURE — 86803 HEPATITIS C AB TEST: CPT | Performed by: INTERNAL MEDICINE

## 2021-08-16 PROCEDURE — 85027 COMPLETE CBC AUTOMATED: CPT | Performed by: INTERNAL MEDICINE

## 2021-08-16 PROCEDURE — 80061 LIPID PANEL: CPT | Performed by: INTERNAL MEDICINE

## 2021-08-16 PROCEDURE — 80053 COMPREHEN METABOLIC PANEL: CPT | Performed by: INTERNAL MEDICINE

## 2021-08-16 PROCEDURE — G0103 PSA SCREENING: HCPCS | Performed by: INTERNAL MEDICINE

## 2021-08-16 PROCEDURE — 36415 COLL VENOUS BLD VENIPUNCTURE: CPT | Performed by: INTERNAL MEDICINE

## 2021-08-16 PROCEDURE — 99214 OFFICE O/P EST MOD 30 MIN: CPT | Mod: 25 | Performed by: INTERNAL MEDICINE

## 2021-08-16 RX ORDER — GABAPENTIN 300 MG/1
900 CAPSULE ORAL 3 TIMES DAILY
Qty: 540 CAPSULE | Refills: 3 | Status: SHIPPED | OUTPATIENT
Start: 2021-08-16 | End: 2022-07-20

## 2021-08-16 ASSESSMENT — ENCOUNTER SYMPTOMS
PALPITATIONS: 0
PARESTHESIAS: 1
NERVOUS/ANXIOUS: 0
JOINT SWELLING: 0
HEMATURIA: 0
MYALGIAS: 0
ARTHRALGIAS: 0
FEVER: 0
DIZZINESS: 0
DYSURIA: 0
CONSTIPATION: 0
DIARRHEA: 0
COUGH: 0
HEARTBURN: 0
CHILLS: 0
SORE THROAT: 0
WEAKNESS: 0
FREQUENCY: 0
HEMATOCHEZIA: 0
ABDOMINAL PAIN: 0
EYE PAIN: 0
SHORTNESS OF BREATH: 0
NAUSEA: 0
HEADACHES: 0

## 2021-08-16 ASSESSMENT — MIFFLIN-ST. JEOR: SCORE: 1858.06

## 2021-08-16 NOTE — PROGRESS NOTES
SUBJECTIVE:   CC: Jamar Reina is an 50 year old male who presents for preventative health visit.       Patient has been advised of split billing requirements and indicates understanding: Yes  Healthy Habits:     Getting at least 3 servings of Calcium per day:  Yes    Bi-annual eye exam:  Yes    Dental care twice a year:  Yes    Sleep apnea or symptoms of sleep apnea:  None    Diet:  Gluten-free/reduced    Frequency of exercise:  6-7 days/week    Duration of exercise:  Greater than 60 minutes    Taking medications regularly:  Yes    Medication side effects:  None    PHQ-2 Total Score: 2    Additional concerns today:  No      Today's PHQ-2 Score:   PHQ-2 (  [employer]) 2021   Q1: Little interest or pleasure in doing things 1   Q2: Feeling down, depressed or hopeless 1   PHQ-2 Score 2   Q1: Little interest or pleasure in doing things Several days   Q2: Feeling down, depressed or hopeless Several days   PHQ-2 Score 2       Abuse: Current or Past(Physical, Sexual or Emotional)- No  Do you feel safe in your environment? Yes    Have you ever done Advance Care Planning? (For example, a Health Directive, POLST, or a discussion with a medical provider or your loved ones about your wishes): No, advance care planning information given to patient to review.  Patient declined advance care planning discussion at this time.    Social History     Tobacco Use     Smoking status: Former Smoker     Packs/day: 1.00     Years: 24.00     Pack years: 24.00     Types: Cigarettes, Dip, chew, snus or snuff     Quit date: 2010     Years since quittin.3     Smokeless tobacco: Former User     Types: Chew     Quit date: 2008     Tobacco comment: quit both 2010   Substance Use Topics     Alcohol use: Yes     Alcohol/week: 0.0 standard drinks     Comment: 3 drinks per week     If you drink alcohol do you typically have >3 drinks per day or >7 drinks per week? No    Alcohol Use 2021   Prescreen: >3 drinks/day or  >7 drinks/week? No   Prescreen: >3 drinks/day or >7 drinks/week? -       Last PSA:   PSA   Date Value Ref Range Status   2019 3.05 0 - 4 ug/L Final     Comment:     Assay Method:  Chemiluminescence using Siemens Vista analyzer       Reviewed orders with patient. Reviewed health maintenance and updated orders accordingly - Yes  Patient Active Problem List   Diagnosis     Allergic rhinitis     High triglycerides     Chronic gout with tophus, unspecified cause, unspecified site     Past Surgical History:   Procedure Laterality Date     BIOPSY       C RAD RESEC TONSIL/PILLARS       C REPAIR CRUCIATE LIGAMENT,KNEE      Right knee     COLONOSCOPY  2012     HERNIA REPAIR  2011     SOFT TISSUE SURGERY  ACL 91'     VASECTOMY       [facility] NONSPECIFIC PROCEDURE      Loose body excursion removal     [facility] NONSPECIFIC PROCEDURE  10/05    [place] teeth removed x 4       Social History     Tobacco Use     Smoking status: Former Smoker     Packs/day: 1.00     Years: 24.00     Pack years: 24.00     Types: Cigarettes, Dip, chew, snus or snuff     Quit date: 2010     Years since quittin.3     Smokeless tobacco: Former User     Types: Chew     Quit date: 2008     Tobacco comment: quit both 2010   Substance Use Topics     Alcohol use: Yes     Alcohol/week: 0.0 standard drinks     Comment: 3 drinks per week     Family History   Problem Relation Age of Onset     Obesity Mother      Hypertension Father      Arthritis Father         RA     Gastrointestinal Disease Father         Colon polyps --- not cancer but had part of his colon removed.     Arrhythmia Father         defibrillator or pace maker in place      Cerebrovascular Disease Father      Colon Cancer Father      Diabetes Maternal Grandmother         insulin dependant     Heart Disease Maternal Grandfather          of MI at age 87     Cancer - colorectal Maternal Grandfather         at age 80     Allergies Brother         seasonal and animal      Allergies Sister      Other Cancer Cousin          Current Outpatient Medications   Medication Sig Dispense Refill     allopurinol (ZYLOPRIM) 300 MG tablet TAKE  1  &  1/2  TABLETS BY MOUTH DAILY. 135 tablet 0     famotidine (PEPCID) 10 MG tablet Take 10 mg by mouth 2 times daily       fexofenadine (ALLEGRA) 180 MG tablet Take 180 mg by mouth 2 times daily       gabapentin (NEURONTIN) 300 MG capsule TAKE ONE TO TWO CAPSULES BY MOUTH DAILY AT BEDTIME AS NEEDED. 180 capsule 0     hydrocortisone, Perianal, (HYDROCORTISONE) 2.5 % cream Place rectally 2 times daily as needed for hemorrhoids 30 g 1     imiquimod (ALDARA) 5 % cream Apply a small sized amount to warts or molluscum three times weekly at bedtime.   Wash off after 8 hours.   May use for up to 16 weeks. 12 packet 11     ketotifen (ZADITOR) 0.025 % ophthalmic solution Place 1 drop into both eyes 2 times daily       montelukast (SINGULAIR) 10 MG tablet Take 1 tablet by mouth  1     MULTIVITAMIN TABS   OR 1 TABLET DAILY       Pseudoephedrine HCl 30 MG CAPS        Allergies   Allergen Reactions     Animal Dander      Beta Adrenergic Blockers      Other reaction(s): Other, see comments  Patient is on allergy injections, Beta Blockers contraindicated. If medically necessary, it is OK to prescribe a Beta Blocker, but the allergy injections will need to be discontinued.      Flonase [Fluticasone] Other (See Comments)     Nose bleeds     Seasonal Allergies      Zyrtec [Cetirizine] Hives       Reviewed and updated as needed this visit by clinical staff  Tobacco  Allergies  Meds              Reviewed and updated as needed this visit by Provider                Past Medical History:   Diagnosis Date     Allergic rhinitis      Concussion, unspecified 1991    Loss of consciencness     Gout      Other and unspecified hyperlipidemia       Past Surgical History:   Procedure Laterality Date     BIOPSY  2010     C RAD RESEC TONSIL/PILLARS       C REPAIR CRUCIATE LIGAMENT,KNEE   "1991    Right knee     COLONOSCOPY  2012     HERNIA REPAIR  2011     SOFT TISSUE SURGERY  ACL 91'     VASECTOMY  5/06     [facility] NONSPECIFIC PROCEDURE  1998    Loose body excursion removal     [facility] NONSPECIFIC PROCEDURE  10/05    [place] teeth removed x 4       Review of Systems   Constitutional: Negative for chills and fever.   HENT: Negative for congestion, ear pain, hearing loss and sore throat.    Eyes: Negative for pain and visual disturbance.   Respiratory: Negative for cough and shortness of breath.    Cardiovascular: Positive for peripheral edema. Negative for chest pain and palpitations.   Gastrointestinal: Negative for abdominal pain, constipation, diarrhea, heartburn, hematochezia and nausea.   Genitourinary: Negative for discharge, dysuria, frequency, genital sores, hematuria, impotence and urgency.   Musculoskeletal: Negative for arthralgias, joint swelling and myalgias.   Skin: Positive for rash.   Neurological: Positive for paresthesias. Negative for dizziness, weakness and headaches.   Psychiatric/Behavioral: Positive for mood changes. The patient is not nervous/anxious.          OBJECTIVE:   /81 (BP Location: Right arm, Patient Position: Sitting)   Pulse 84   Temp 97.8  F (36.6  C) (Temporal)   Resp 12   Ht 1.843 m (6' 0.56\")   Wt 95.1 kg (209 lb 11.2 oz)   SpO2 95%   BMI 28.00 kg/m      Physical Exam  GENERAL: healthy, alert and no distress  EYES: Eyes grossly normal to inspection, PERRL and conjunctivae and sclerae normal  HENT: ear canals and TM's normal  NECK: no adenopathy, no asymmetry, masses, or scars and thyroid normal to palpation  RESP: lungs clear to auscultation - no rales, rhonchi or wheezes  CV: regular rate and rhythm, normal S1 S2, no S3 or S4, no murmur, click or rub, no peripheral edema and peripheral pulses strong  ABDOMEN: soft, nontender, no hepatosplenomegaly, no masses and bowel sounds normal  RECTAL: normal sphincter tone, no rectal masses, prostate normal size, " smooth, nontender without nodules or masses  MS: no gross musculoskeletal defects noted, no edema  SKIN: no suspicious lesions or rashes  NEURO: Normal strength and tone, mentation intact and speech normal  PSYCH: mentation appears normal, affect normal/bright,describes depressed and anxious mood, no suicidal ideation     Labs pending     ASSESSMENT/PLAN:   1. Routine general medical examination at a health care facility    - Hepatitis C Screen Reflex to HCV RNA Quant and Genotype; Future  - PSA, screen; Future  - Lipid panel reflex to direct LDL Fasting; Future  - Comprehensive metabolic panel (BMP + Alb, Alk Phos, ALT, AST, Total. Bili, TP); Future  - CBC with platelets; Future    2. Chronic gout with tophus, unspecified cause, unspecified site  Stable; goal urate < 6  - Uric acid; Future    3. High triglycerides  Recheck     4. Colon cancer screening  Referred for exam  - Adult Gastro Ref - Procedure Only; Future    5. Peripheral polyneuropathy  Can increase dose of gabapetin as symptoms dictate to maximum dose of  900 TID ; schedule for safely increasing dose discussed with patient   - gabapentin (NEURONTIN) 300 MG capsule; Take 3 capsules (900 mg) by mouth 3 times daily  Dispense: 540 capsule; Refill: 3    6. Adjustment disorder with mixed anxiety and depressed mood  Could start with some talk therapy; pending evaluation there if problem severe enough, could return here to discuss drug therapy as symptoms dicatate   - MENTAL HEALTH REFERRAL  - Adult; Outpatient Treatment; Individual/Couples/Family/Group Therapy/Health Psychology; [facility] [phone]; We will contact you to schedule the appointment or please call with any questions; Future    Patient has been advised of split billing requirements and indicates understanding: Yes  COUNSELING:   Reviewed preventive health counseling, as reflected in patient instructions  Special attention given to:        Regular exercise       Healthy  "diet/nutrition       Immunizations    Vaccines are up to date             Consider Hep C screening for all patients one time for ages 18-79 years; will do        HIV screeninx in teen years, 1x in adult years, and at intervals if high risk       Colon cancer screening; referred for the exam        Prostate cancer screening:  PSA    Estimated body mass index is 28 kg/m  as calculated from the following:    Height as of this encounter: 1.843 m (6' 0.56\").    Weight as of this encounter: 95.1 kg (209 lb 11.2 oz).     Weight management plan: Discussed healthy diet and exercise guidelines    He reports that he quit smoking about 11 years ago. His smoking use included cigarettes and dip, chew, snus or snuff. He has a 24.00 pack-year smoking history. He quit smokeless tobacco use about 12 years ago.  His smokeless tobacco use included chew.      Counseling Resources:  ATP IV Guidelines  Pooled Cohorts Equation Calculator  FRAX Risk Assessment  ICSI Preventive Guidelines  Dietary Guidelines for Americans,   USDA's MyPlate  ASA Prophylaxis  Lung CA Screening    Shiva Pace MD  [facility]  "

## 2021-08-16 NOTE — TELEPHONE ENCOUNTER
Reason for Call:  Medication or medication refill:    Do you use a [facility]?  Name of the pharmacy and phone number for the current request:       [id number] - [place], [place] - [address]    Name of the medication requested:   gabapentin (NEURONTIN) 300 MG capsule 540 capsule       Other request: Char called wanting to clarify this new prescription as it is a large increase from previous, also wondering about previous refill, should this be cancelled and the new one filled in it's place?    Can we leave a detailed message on this number? YES    Phone number pharmacy can be reached at: Other phone number:  [phone]    Best Time: asap    Call taken on 8/16/2021 at 2:29 PM by Cheryl Pinto

## 2021-08-16 NOTE — TELEPHONE ENCOUNTER
Discussed schedule for safely increasing dose to goal dose of 900 TID in clinic, so she can fill prescription

## 2021-08-17 LAB
ALBUMIN SERPL-MCNC: 4 G/DL (ref 3.4–5)
ALP SERPL-CCNC: 73 U/L (ref 40–150)
ALT SERPL W P-5'-P-CCNC: 29 U/L (ref 0–70)
ANION GAP SERPL CALCULATED.3IONS-SCNC: 4 MMOL/L (ref 3–14)
AST SERPL W P-5'-P-CCNC: 16 U/L (ref 0–45)
BILIRUB SERPL-MCNC: 0.9 MG/DL (ref 0.2–1.3)
BUN SERPL-MCNC: 16 MG/DL (ref 7–30)
CALCIUM SERPL-MCNC: 9.7 MG/DL (ref 8.5–10.1)
CHLORIDE BLD-SCNC: 105 MMOL/L (ref 94–109)
CHOLEST SERPL-MCNC: 196 MG/DL
CO2 SERPL-SCNC: 28 MMOL/L (ref 20–32)
CREAT SERPL-MCNC: 1.06 MG/DL (ref 0.66–1.25)
FASTING STATUS PATIENT QL REPORTED: NO
GFR SERPL CREATININE-BSD FRML MDRD: 81 ML/MIN/1.73M2
GLUCOSE BLD-MCNC: 105 MG/DL (ref 70–99)
HCV AB SERPL QL IA: NONREACTIVE
HDLC SERPL-MCNC: 35 MG/DL
LDLC SERPL CALC-MCNC: 121 MG/DL
NONHDLC SERPL-MCNC: 161 MG/DL
POTASSIUM BLD-SCNC: 4 MMOL/L (ref 3.4–5.3)
PROT SERPL-MCNC: 7.1 G/DL (ref 6.8–8.8)
PSA SERPL-MCNC: 3.05 UG/L (ref 0–4)
SODIUM SERPL-SCNC: 137 MMOL/L (ref 133–144)
TRIGL SERPL-MCNC: 200 MG/DL
URATE SERPL-MCNC: 4.5 MG/DL (ref 3.5–7.2)

## 2021-08-17 NOTE — TELEPHONE ENCOUNTER
Called and spoke with pharmacy, advised provider discussed with patient on 8/16 office visit, how to safely increase to the goal dose.    Janett Savage RN

## 2021-08-17 NOTE — RESULT ENCOUNTER NOTE
"The following letter pertains to your most recent diagnostic tests:     -Your prostate specific antigen (PSA) test result returned normal.      -Your total cholesterol is 196 which is at your goal of total cholesterol less than 200.     -Your triglycerides are 200 which are just above your goal of triglycerides less than 150, but NOT dangerously elevated.       -Your HDL or \"good cholesterol\" is 35 which is below your goal of HDL cholesterol greater than 40.     -Your LDL cholesterol or \"bad cholesterol\" is 121 which is at your goal of LDL cholesterol less than <160.  Your LDL goal is based on your risk factors for artery disease.     -The gout blood tests uric acid is at our goal of less than 6.     -Liver and gallbladder tests are normal for you. (ALT,AST, Alk phos, bilirubin), kidney function is normal for you (Creatinine, GFR), Sodium is normal, Potassium is normal for you, Calcium is normal for you, Glucose (blood sugar) is mildly elevated, but NOT in the diabetic range.      -Your complete blood counts including your hemoglobin returned normal for you.                 Bottom line:  Overall, the lab results look OK.  Working on further weight loss and reducing diet starches and sugars can improve your triglycerides, but much of this is genetically determined.       You may increase the gabapentin by 300 mg per day every 3 days or so to achieve neuropathy symptom control.  Do not exceed 900 mg three times per day.          Follow up:  Schedule an appointment for a physical examination with fasting blood tests in one year's time, or return sooner if new questions, symptoms or problems arise.           Sincerely,     Dr. Pace"

## 2021-08-18 ENCOUNTER — TELEPHONE (OUTPATIENT)
Dept: GASTROENTEROLOGY | Facility: OUTPATIENT CENTER | Age: 51
End: 2021-08-18

## 2021-08-18 ENCOUNTER — TELEPHONE (OUTPATIENT)
Dept: FAMILY MEDICINE | Facility: CLINIC | Age: 51
End: 2021-08-18

## 2021-08-18 NOTE — TELEPHONE ENCOUNTER
"Screening Questions  1. Are you active on mychart?Y    2. What insurance is in the chart?     2.  Ordering/Referring Provider:     3. BMI : 6'0\"/210=28.5    4. Are you on daily home oxygen? N    5. Do you have a history of difficult airway? N    6. Have you had a heart, lung, or liver transplant? N    7. Are you currently on dialysis? N    8. Have you had a stroke or Transient ischemic atttack (TIA) within 6 months? N    9. In the past 6 months, have you had any heart related issues including cardiomyopathy or heart attack?         If yes, did it require cardiac stenting or other implantable device?N    10. Do you have any implantable devices in your body (pacemaker, defib, LVAD)? N    11. Do you take nitroglycerin? If yes, how often? N    12. Are you currently taking any blood thinners?N    13. Are you a diabetic? N    14. (Females) Are you currently pregnant? N/A  If yes, how many weeks?    15. Have you had a procedure in the past that was difficult to tolerate with conscious sedation? Any allergies to Fentanyl or Versed N    16. Are you taking any scheduled prescription narcotics more than once daily? N    17. Do you have any chemical dependencies such as alcohol, street drugs, or methadone? N    18. Do you have any history of post-traumatic stress syndrome or mental health issues? N    19. Do you transfer independently? Y    20.  Do you have any issues with constipation? : N    21. Preferred Pharmacy for Pre Prescription : [id number] - [place], [place] - [address]    Scheduling Details    Which Colonoscopy Prep was Sent?: MIRALAX  Procedure Scheduled: COLON-CS  Provider/Surgeon: LOVELY  Date of Procedure: 9/16  Location: [place]  Caller (Please ask for phone number if not scheduled by patient): PATIENT      Sedation Type: CS  Conscious Sedation- Needs  for 6 hours after the procedure  MAC/General-Needs  for 24 hours after procedure    Pre-op Required at [facility], [facility] and OR for " MAC sedation:   (if yes advise patient they will need a pre-op prior to procedure)      Is patient on blood thinners? -N (If yes- inform patient to follow up with PCP or provider for follow up instructions)     Informed patient they will need an adult  Y  Cannot take any type of public or medical transportation alone    Informed Patient of COVID Test Requirement Y    Confirmed Nurse will call to complete assessment Y    Additional comments:

## 2021-08-18 NOTE — TELEPHONE ENCOUNTER
[facility] confirming rx directions for gabapentin due to large increase in dose.    Per 8/16/21 OV note:  You may increase the gabapentin by 300 mg per day every 3 days or so to achieve neuropathy symptom control.  Do not exceed 900 mg three times per day.     [facility] asking that triage confirm dosing with the patient.   Left non-detailed message to call the clinic back at [phone] and ask to speak with a nurse. Also sent [employer] confirmation of directions.     Maricel Howard RN

## 2021-08-21 DIAGNOSIS — Z11.59 ENCOUNTER FOR SCREENING FOR OTHER VIRAL DISEASES: ICD-10-CM

## 2021-09-13 ENCOUNTER — LAB (OUTPATIENT)
Dept: LAB | Facility: CLINIC | Age: 51
End: 2021-09-13
Payer: COMMERCIAL

## 2021-09-13 DIAGNOSIS — Z11.59 ENCOUNTER FOR SCREENING FOR OTHER VIRAL DISEASES: ICD-10-CM

## 2021-09-13 LAB — SARS-COV-2 RNA RESP QL NAA+PROBE: NEGATIVE

## 2021-09-13 PROCEDURE — U0003 INFECTIOUS AGENT DETECTION BY NUCLEIC ACID (DNA OR RNA); SEVERE ACUTE RESPIRATORY SYNDROME CORONAVIRUS 2 (SARS-COV-2) (CORONAVIRUS DISEASE [COVID-19]), AMPLIFIED PROBE TECHNIQUE, MAKING USE OF HIGH THROUGHPUT TECHNOLOGIES AS DESCRIBED BY CMS-2020-01-R: HCPCS

## 2021-09-13 PROCEDURE — U0005 INFEC AGEN DETEC AMPLI PROBE: HCPCS

## 2021-09-15 DIAGNOSIS — M1A.9XX1 CHRONIC GOUT WITH TOPHUS, UNSPECIFIED CAUSE, UNSPECIFIED SITE: ICD-10-CM

## 2021-09-16 ENCOUNTER — HOSPITAL ENCOUNTER (OUTPATIENT)
Facility: CLINIC | Age: 51
Discharge: HOME | End: 2021-09-16
Attending: INTERNAL MEDICINE | Admitting: INTERNAL MEDICINE
Payer: COMMERCIAL

## 2021-09-16 VITALS
OXYGEN SATURATION: 98 % | WEIGHT: 210 LBS | SYSTOLIC BLOOD PRESSURE: 112 MMHG | DIASTOLIC BLOOD PRESSURE: 67 MMHG | RESPIRATION RATE: 14 BRPM | BODY MASS INDEX: 27.83 KG/M2 | HEIGHT: 73 IN | HEART RATE: 56 BPM

## 2021-09-16 LAB — COLONOSCOPY: NORMAL

## 2021-09-16 PROCEDURE — 250N000011 HC RX IP 250 OP 636: Performed by: INTERNAL MEDICINE

## 2021-09-16 PROCEDURE — 88305 TISSUE EXAM BY PATHOLOGIST: CPT | Mod: TC | Performed by: INTERNAL MEDICINE

## 2021-09-16 PROCEDURE — 45380 COLONOSCOPY AND BIOPSY: CPT | Mod: PT | Performed by: INTERNAL MEDICINE

## 2021-09-16 PROCEDURE — G0500 MOD SEDAT ENDO SERVICE >5YRS: HCPCS | Performed by: INTERNAL MEDICINE

## 2021-09-16 RX ORDER — ALLOPURINOL 300 MG/1
TABLET ORAL
Qty: 135 TABLET | Refills: 0 | Status: SHIPPED | OUTPATIENT
Start: 2021-09-16 | End: 2021-12-22

## 2021-09-16 RX ORDER — FENTANYL CITRATE 50 UG/ML
INJECTION, SOLUTION INTRAMUSCULAR; INTRAVENOUS PRN
Status: COMPLETED | OUTPATIENT
Start: 2021-09-16 | End: 2021-09-16

## 2021-09-16 RX ADMIN — MIDAZOLAM 2 MG: 1 INJECTION INTRAMUSCULAR; INTRAVENOUS at 11:38

## 2021-09-16 RX ADMIN — FENTANYL CITRATE 100 MCG: 50 INJECTION, SOLUTION INTRAMUSCULAR; INTRAVENOUS at 11:38

## 2021-09-16 RX ADMIN — MIDAZOLAM 2 MG: 1 INJECTION INTRAMUSCULAR; INTRAVENOUS at 11:40

## 2021-09-16 ASSESSMENT — MIFFLIN-ST. JEOR: SCORE: 1858.49

## 2021-09-16 NOTE — TELEPHONE ENCOUNTER
Prescription approved per [facility] Refill Protocol.        CBC RESULTS: Recent Labs   Lab Test 08/16/21  1435   WBC 6.0   RBC 5.34   HGB 17.1   HCT 47.9   MCV 90   MCH 32.0   MCHC 35.7   RDW 13.3

## 2021-09-19 ENCOUNTER — HEALTH MAINTENANCE LETTER (OUTPATIENT)
Age: 51
End: 2021-09-19

## 2021-09-20 LAB
PATH REPORT.COMMENTS IMP SPEC: NORMAL
PATH REPORT.COMMENTS IMP SPEC: NORMAL
PATH REPORT.FINAL DX SPEC: NORMAL
PATH REPORT.GROSS SPEC: NORMAL
PATH REPORT.MICROSCOPIC SPEC OTHER STN: NORMAL
PHOTO IMAGE: NORMAL

## 2021-09-20 PROCEDURE — 88305 TISSUE EXAM BY PATHOLOGIST: CPT | Mod: 26 | Performed by: PATHOLOGY

## 2021-11-14 ENCOUNTER — HEALTH MAINTENANCE LETTER (OUTPATIENT)
Age: 51
End: 2021-11-14

## 2021-12-22 DIAGNOSIS — M1A.9XX1 CHRONIC GOUT WITH TOPHUS, UNSPECIFIED CAUSE, UNSPECIFIED SITE: ICD-10-CM

## 2021-12-22 RX ORDER — ALLOPURINOL 300 MG/1
TABLET ORAL
Qty: 135 TABLET | Refills: 0 | Status: SHIPPED | OUTPATIENT
Start: 2021-12-22 | End: 2022-03-14

## 2022-06-12 DIAGNOSIS — M1A.9XX1 CHRONIC GOUT WITH TOPHUS, UNSPECIFIED CAUSE, UNSPECIFIED SITE: ICD-10-CM

## 2022-06-15 RX ORDER — ALLOPURINOL 300 MG/1
TABLET ORAL
Qty: 135 TABLET | Refills: 0 | Status: SHIPPED | OUTPATIENT
Start: 2022-06-15 | End: 2022-09-15

## 2022-06-15 NOTE — TELEPHONE ENCOUNTER
Prescription approved per [facility] Refill Protocol.  LOV: 8/26/21    Writer sent patient mychart appointment reminder.     Mustapha Slaughter RN  [facility]

## 2022-07-20 DIAGNOSIS — G62.9 PERIPHERAL POLYNEUROPATHY: ICD-10-CM

## 2022-07-20 RX ORDER — GABAPENTIN 300 MG/1
900 CAPSULE ORAL 3 TIMES DAILY
Qty: 540 CAPSULE | Refills: 0 | Status: SHIPPED | OUTPATIENT
Start: 2022-07-20 | End: 2022-09-15

## 2022-08-18 ENCOUNTER — OFFICE VISIT (OUTPATIENT)
Dept: FAMILY MEDICINE | Facility: CLINIC | Age: 52
End: 2022-08-18
Payer: COMMERCIAL

## 2022-08-18 VITALS
DIASTOLIC BLOOD PRESSURE: 82 MMHG | HEART RATE: 82 BPM | HEIGHT: 73 IN | OXYGEN SATURATION: 97 % | SYSTOLIC BLOOD PRESSURE: 129 MMHG | TEMPERATURE: 97.7 F | RESPIRATION RATE: 16 BRPM | BODY MASS INDEX: 27.3 KG/M2 | WEIGHT: 206 LBS

## 2022-08-18 DIAGNOSIS — Z12.5 SCREENING FOR PROSTATE CANCER: ICD-10-CM

## 2022-08-18 DIAGNOSIS — Z00.00 ROUTINE HISTORY AND PHYSICAL EXAMINATION OF ADULT: Primary | ICD-10-CM

## 2022-08-18 DIAGNOSIS — M1A.9XX1 CHRONIC GOUT WITH TOPHUS, UNSPECIFIED CAUSE, UNSPECIFIED SITE: ICD-10-CM

## 2022-08-18 DIAGNOSIS — B07.8 COMMON WART: ICD-10-CM

## 2022-08-18 PROCEDURE — G0103 PSA SCREENING: HCPCS | Performed by: INTERNAL MEDICINE

## 2022-08-18 PROCEDURE — 36415 COLL VENOUS BLD VENIPUNCTURE: CPT | Performed by: INTERNAL MEDICINE

## 2022-08-18 PROCEDURE — 84550 ASSAY OF BLOOD/URIC ACID: CPT | Performed by: INTERNAL MEDICINE

## 2022-08-18 PROCEDURE — 99396 PREV VISIT EST AGE 40-64: CPT | Performed by: INTERNAL MEDICINE

## 2022-08-18 RX ORDER — IMIQUIMOD 12.5 MG/.25G
CREAM TOPICAL
Qty: 12 PACKET | Refills: 3 | Status: SHIPPED | OUTPATIENT
Start: 2022-08-18

## 2022-08-18 ASSESSMENT — ENCOUNTER SYMPTOMS
PALPITATIONS: 0
SHORTNESS OF BREATH: 0
CHILLS: 0
FREQUENCY: 0
HEARTBURN: 0
PARESTHESIAS: 0
FEVER: 0
NERVOUS/ANXIOUS: 0
SORE THROAT: 0
DIZZINESS: 0
HEADACHES: 1
EYE PAIN: 0
DIARRHEA: 0
HEMATOCHEZIA: 0
MYALGIAS: 0
WEAKNESS: 0
COUGH: 0
ABDOMINAL PAIN: 0
JOINT SWELLING: 0
HEMATURIA: 0
CONSTIPATION: 0
NAUSEA: 0
ARTHRALGIAS: 0
DYSURIA: 0

## 2022-08-18 ASSESSMENT — PAIN SCALES - GENERAL: PAINLEVEL: NO PAIN (0)

## 2022-08-18 NOTE — LETTER
August 19, 2022      Jamar Reina  [address] [place] [id number]        Dear ,    We are writing to inform you of your test results.    Good news! Your test results fall within the expected range.    Resulted Orders   PSA, screen   Result Value Ref Range    Prostate Specific Antigen Screen 2.45 0.00 - 4.00 ug/L   Uric acid   Result Value Ref Range    Uric Acid 4.5 3.5 - 7.2 mg/dL       If you have any questions or concerns, please call the clinic at the number listed above.       Sincerely,      Stephanie Hummel MD

## 2022-08-18 NOTE — PROGRESS NOTES
SUBJECTIVE:   CC: Jamar Reina is an 51 year old male who presents for preventative health visit.       Patient has been advised of split billing requirements and indicates understanding: Yes  Healthy Habits:     Getting at least 3 servings of Calcium per day:  Yes    Bi-annual eye exam:  Yes    Dental care twice a year:  Yes    Sleep apnea or symptoms of sleep apnea:  None    Diet:  Gluten-free/reduced    Frequency of exercise:  4-5 days/week    Duration of exercise:  15-30 minutes    Taking medications regularly:  Yes    Medication side effects:  No muscle aches and No significant flushing    PHQ-2 Total Score: 2    Additional concerns today:  No    Ability to successfully perform activities of daily living: Yes, no assistance needed  Home safety:  none identified   Hearing impairment: none        Today's PHQ-2 Score:   PHQ-2 (  [employer]) 2022   Q1: Little interest or pleasure in doing things 1   Q2: Feeling down, depressed or hopeless 1   PHQ-2 Score 2   PHQ-2 Total Score (12-17 Years)- Positive if 3 or more points; Administer PHQ-A if positive -   Q1: Little interest or pleasure in doing things Several days   Q2: Feeling down, depressed or hopeless Several days   PHQ-2 Score 2       Abuse: Current or Past(Physical, Sexual or Emotional)- No  Do you feel safe in your environment? Yes        Social History     Tobacco Use     Smoking status: Former Smoker     Packs/day: 1.00     Years: 24.00     Pack years: 24.00     Types: Cigarettes, Dip, chew, snus or snuff     Quit date: 2010     Years since quittin.3     Smokeless tobacco: Former User     Types: Chew     Quit date: 2008     Tobacco comment: quit both 2010   Substance Use Topics     Alcohol use: Yes     Alcohol/week: 0.0 standard drinks     Comment: 3-4 month     If you drink alcohol do you typically have >3 drinks per day or >7 drinks per week? No    Alcohol Use 2022   Prescreen: >3 drinks/day or >7 drinks/week? No  "  Prescreen: >3 drinks/day or >7 drinks/week? -   No flowsheet data found.    Last PSA:   PSA   Date Value Ref Range Status   08/02/2019 3.05 0 - 4 ug/L Final     Comment:     Assay Method:  Chemiluminescence using Siemens Vista analyzer     Prostate Specific Antigen Screen   Date Value Ref Range Status   08/16/2021 3.05 0.00 - 4.00 ug/L Final       Reviewed orders with patient. Reviewed health maintenance and updated orders accordingly - Yes  Labs reviewed in EPIC    Reviewed and updated as needed this visit by clinical staff                    Reviewed and updated as needed this visit by Provider                   Past Medical History:   Diagnosis Date     Allergic rhinitis      Celiac disease      Chronic gout with tophus, unspecified cause, unspecified site      Concussion, unspecified 1991    Loss of consciencness     Gout      Neuropathy      Other and unspecified hyperlipidemia         Review of Systems  CONSTITUTIONAL: NEGATIVE for fever, chills, change in weight  INTEGUMENTARY/SKIN: POSITIVE for common wart  EYES: NEGATIVE for vision changes or irritation  ENT: NEGATIVE for ear, mouth and throat problems  RESP: NEGATIVE for significant cough or SOB  CV: NEGATIVE for chest pain, palpitations or peripheral edema  GI: NEGATIVE for nausea, abdominal pain, heartburn, or change in bowel habits   male: negative for dysuria, hematuria, decreased urinary stream, erectile dysfunction, urethral discharge  MUSCULOSKELETAL: NEGATIVE for significant arthralgias or myalgia  NEURO: NEGATIVE for weakness, dizziness or paresthesias  PSYCHIATRIC: NEGATIVE for changes in mood or affect    OBJECTIVE:   /82 (BP Location: Right arm, Patient Position: Sitting, Cuff Size: Adult Regular)   Pulse 82   Temp 97.7  F (36.5  C) (Temporal)   Resp 16   Ht 1.842 m (6' 0.5\")   Wt 93.4 kg (206 lb)   SpO2 97%   BMI 27.55 kg/m      Physical Exam  GENERAL: alert and no distress  EYES: Eyes grossly normal to inspection, PERRL and " "conjunctivae and sclerae normal  HENT: ear canals and TM's normal, nose and mouth without ulcers or lesions  NECK: no adenopathy, no asymmetry, masses, or scars and thyroid normal to palpation  RESP: lungs clear to auscultation - no rales, rhonchi or wheezes  CV: regular rate and rhythm  ABDOMEN: soft, nontender, no hepatosplenomegaly, no masses and bowel sounds normal  MS: no gross musculoskeletal defects noted, no edema  SKIN: wart symptoms present on hand  NEURO: Normal strength and tone, mentation intact and speech normal  PSYCH: mentation appears normal, affect normal/bright    Diagnostic Test Results:  Labs reviewed in Epic    ASSESSMENT/PLAN:   Jamar was seen today for physical.    Diagnoses and all orders for this visit:    Routine history and physical examination of adult    Common wart  -     imiquimod (ALDARA) 5 % external cream; Apply a small sized amount to warts or molluscum three times weekly at bedtime.   Wash off after 8 hours.   May use for up to 16 weeks.    Screening for prostate cancer  -     PSA, screen; Future  -     PSA, screen    Chronic gout with tophus, unspecified cause, unspecified site  -     Uric acid; Future  -     Uric acid    Other orders  -     REVIEW OF HEALTH MAINTENANCE PROTOCOL ORDERS        Patient has been advised of split billing requirements and indicates understanding: Yes    COUNSELING:   Reviewed preventive health counseling, as reflected in patient instructions  Special attention given to:        Regular exercise       Healthy diet/nutrition    Estimated body mass index is 28.09 kg/m  as calculated from the following:    Height as of 9/16/21: 1.842 m (6' 0.5\").    Weight as of 9/16/21: 95.3 kg (210 lb).     Weight management plan: Discussed healthy diet and exercise guidelines    He reports that he quit smoking about 12 years ago. His smoking use included cigarettes and dip, chew, snus or snuff. He has a 24.00 pack-year smoking history. He quit smokeless tobacco use " about 13 years ago.  His smokeless tobacco use included chew.      Counseling Resources:  ATP IV Guidelines  Pooled Cohorts Equation Calculator  FRAX Risk Assessment  ICSI Preventive Guidelines  Dietary Guidelines for Americans, 2010  USDA's MyPlate  ASA Prophylaxis  Lung CA Screening    Stephanie Hummel MD  [facility]

## 2022-08-19 LAB
PSA SERPL-MCNC: 2.45 UG/L (ref 0–4)
URATE SERPL-MCNC: 4.5 MG/DL (ref 3.5–7.2)

## 2022-09-13 DIAGNOSIS — M1A.9XX1 CHRONIC GOUT WITH TOPHUS, UNSPECIFIED CAUSE, UNSPECIFIED SITE: ICD-10-CM

## 2022-09-13 DIAGNOSIS — G62.9 PERIPHERAL POLYNEUROPATHY: ICD-10-CM

## 2022-09-15 RX ORDER — ALLOPURINOL 300 MG/1
TABLET ORAL
Qty: 135 TABLET | Refills: 0 | Status: SHIPPED | OUTPATIENT
Start: 2022-09-15 | End: 2022-11-22

## 2022-09-15 RX ORDER — GABAPENTIN 300 MG/1
900 CAPSULE ORAL 3 TIMES DAILY
Qty: 540 CAPSULE | Refills: 0 | Status: SHIPPED | OUTPATIENT
Start: 2022-09-15 | End: 2023-01-05

## 2022-09-15 NOTE — TELEPHONE ENCOUNTER
Routing refill request to provider for review/approval because:  Drug not on the [facility] refill protocol    CBC on file in past 12 months    ALT on file in past 12 months    Normal serum creatinine on file in the past 12 months       Sage Gresham RN  [facility] Triage Nurse

## 2022-11-19 DIAGNOSIS — M1A.9XX1 CHRONIC GOUT WITH TOPHUS, UNSPECIFIED CAUSE, UNSPECIFIED SITE: ICD-10-CM

## 2022-11-20 ENCOUNTER — HEALTH MAINTENANCE LETTER (OUTPATIENT)
Age: 52
End: 2022-11-20

## 2022-11-22 RX ORDER — ALLOPURINOL 300 MG/1
TABLET ORAL
Qty: 135 TABLET | Refills: 3 | Status: SHIPPED | OUTPATIENT
Start: 2022-11-22 | End: 2023-11-05

## 2022-11-22 NOTE — TELEPHONE ENCOUNTER
Dr. Pace,    Can you review refill request for this pt.  Just had a physical with Dr. Hummel in August, but many labs not ordered.  However, since pt seen, wondering if this script is ok to order, as uric was drawn?    Destini Maynard, RN  [facility] RN Triage Team

## 2023-01-05 DIAGNOSIS — G62.9 PERIPHERAL POLYNEUROPATHY: ICD-10-CM

## 2023-01-05 RX ORDER — GABAPENTIN 300 MG/1
900 CAPSULE ORAL 3 TIMES DAILY
Qty: 540 CAPSULE | Refills: 0 | Status: SHIPPED | OUTPATIENT
Start: 2023-01-05 | End: 2023-03-09

## 2023-03-09 DIAGNOSIS — G62.9 PERIPHERAL POLYNEUROPATHY: ICD-10-CM

## 2023-03-09 RX ORDER — GABAPENTIN 300 MG/1
900 CAPSULE ORAL 3 TIMES DAILY
Qty: 540 CAPSULE | Refills: 0 | Status: SHIPPED | OUTPATIENT
Start: 2023-03-09 | End: 2023-05-07

## 2023-05-07 DIAGNOSIS — G62.9 PERIPHERAL POLYNEUROPATHY: ICD-10-CM

## 2023-05-07 RX ORDER — GABAPENTIN 300 MG/1
900 CAPSULE ORAL 3 TIMES DAILY
Qty: 540 CAPSULE | Refills: 0 | Status: SHIPPED | OUTPATIENT
Start: 2023-05-07 | End: 2023-11-05

## 2023-10-03 ENCOUNTER — TRANSFERRED RECORDS (OUTPATIENT)
Dept: HEALTH INFORMATION MANAGEMENT | Facility: CLINIC | Age: 53
End: 2023-10-03
Payer: COMMERCIAL

## 2023-11-04 DIAGNOSIS — M1A.9XX1 CHRONIC GOUT WITH TOPHUS, UNSPECIFIED CAUSE, UNSPECIFIED SITE: ICD-10-CM

## 2023-11-04 DIAGNOSIS — G62.9 PERIPHERAL POLYNEUROPATHY: ICD-10-CM

## 2023-11-05 RX ORDER — ALLOPURINOL 300 MG/1
TABLET ORAL
Qty: 135 TABLET | Refills: 0 | Status: SHIPPED | OUTPATIENT
Start: 2023-11-05 | End: 2024-02-01

## 2023-11-05 RX ORDER — GABAPENTIN 300 MG/1
900 CAPSULE ORAL 3 TIMES DAILY
Qty: 540 CAPSULE | Refills: 0 | Status: SHIPPED | OUTPATIENT
Start: 2023-11-05 | End: 2024-01-29

## 2023-11-25 ENCOUNTER — HEALTH MAINTENANCE LETTER (OUTPATIENT)
Age: 53
End: 2023-11-25

## 2024-01-27 DIAGNOSIS — G62.9 PERIPHERAL POLYNEUROPATHY: ICD-10-CM

## 2024-01-29 RX ORDER — GABAPENTIN 300 MG/1
900 CAPSULE ORAL 3 TIMES DAILY
Qty: 540 CAPSULE | Refills: 0 | Status: SHIPPED | OUTPATIENT
Start: 2024-01-29 | End: 2024-05-01

## 2024-02-01 DIAGNOSIS — M1A.9XX1 CHRONIC GOUT WITH TOPHUS, UNSPECIFIED CAUSE, UNSPECIFIED SITE: ICD-10-CM

## 2024-02-01 RX ORDER — ALLOPURINOL 300 MG/1
TABLET ORAL
Qty: 135 TABLET | Refills: 0 | Status: SHIPPED | OUTPATIENT
Start: 2024-02-01 | End: 2024-04-29

## 2024-04-28 DIAGNOSIS — M1A.9XX1 CHRONIC GOUT WITH TOPHUS, UNSPECIFIED CAUSE, UNSPECIFIED SITE: ICD-10-CM

## 2024-04-29 RX ORDER — ALLOPURINOL 300 MG/1
TABLET ORAL
Qty: 135 TABLET | Refills: 0 | Status: SHIPPED | OUTPATIENT
Start: 2024-04-29 | End: 2024-06-20

## 2024-05-01 DIAGNOSIS — G62.9 PERIPHERAL POLYNEUROPATHY: ICD-10-CM

## 2024-05-01 RX ORDER — GABAPENTIN 300 MG/1
900 CAPSULE ORAL 3 TIMES DAILY
Qty: 540 CAPSULE | Refills: 0 | Status: SHIPPED | OUTPATIENT
Start: 2024-05-01 | End: 2024-08-02

## 2024-06-20 DIAGNOSIS — M1A.9XX1 CHRONIC GOUT WITH TOPHUS, UNSPECIFIED CAUSE, UNSPECIFIED SITE: ICD-10-CM

## 2024-06-20 RX ORDER — ALLOPURINOL 300 MG/1
TABLET ORAL
Qty: 135 TABLET | Refills: 0 | Status: SHIPPED | OUTPATIENT
Start: 2024-06-20

## 2024-08-02 DIAGNOSIS — G62.9 PERIPHERAL POLYNEUROPATHY: ICD-10-CM

## 2024-08-02 RX ORDER — GABAPENTIN 300 MG/1
900 CAPSULE ORAL 3 TIMES DAILY
Qty: 540 CAPSULE | Refills: 0 | Status: SHIPPED | OUTPATIENT
Start: 2024-08-02

## 2024-10-31 DIAGNOSIS — G62.9 PERIPHERAL POLYNEUROPATHY: ICD-10-CM

## 2024-10-31 RX ORDER — GABAPENTIN 300 MG/1
900 CAPSULE ORAL 3 TIMES DAILY
Qty: 540 CAPSULE | Refills: 0 | Status: SHIPPED | OUTPATIENT
Start: 2024-10-31

## 2024-11-21 DIAGNOSIS — M1A.9XX1 CHRONIC GOUT WITH TOPHUS, UNSPECIFIED CAUSE, UNSPECIFIED SITE: ICD-10-CM

## 2024-11-21 RX ORDER — ALLOPURINOL 300 MG/1
TABLET ORAL
Qty: 135 TABLET | Refills: 0 | Status: SHIPPED | OUTPATIENT
Start: 2024-11-21

## 2025-01-04 ENCOUNTER — HEALTH MAINTENANCE LETTER (OUTPATIENT)
Age: 55
End: 2025-01-04

## 2025-01-09 DIAGNOSIS — G62.9 PERIPHERAL POLYNEUROPATHY: ICD-10-CM

## 2025-01-09 RX ORDER — GABAPENTIN 300 MG/1
CAPSULE ORAL
Qty: 540 CAPSULE | Refills: 0 | Status: SHIPPED | OUTPATIENT
Start: 2025-01-09

## 2025-03-03 DIAGNOSIS — M1A.9XX1 CHRONIC GOUT WITH TOPHUS, UNSPECIFIED CAUSE, UNSPECIFIED SITE: ICD-10-CM

## 2025-03-03 RX ORDER — ALLOPURINOL 300 MG/1
TABLET ORAL
Qty: 135 TABLET | Refills: 0 | Status: SHIPPED | OUTPATIENT
Start: 2025-03-03

## 2025-04-22 DIAGNOSIS — G62.9 PERIPHERAL POLYNEUROPATHY: ICD-10-CM

## 2025-04-22 RX ORDER — GABAPENTIN 300 MG/1
CAPSULE ORAL
Qty: 540 CAPSULE | Refills: 0 | Status: SHIPPED | OUTPATIENT
Start: 2025-04-22

## 2025-07-08 DIAGNOSIS — G62.9 PERIPHERAL POLYNEUROPATHY: ICD-10-CM

## 2025-07-08 RX ORDER — GABAPENTIN 300 MG/1
CAPSULE ORAL
Qty: 540 CAPSULE | Refills: 0 | Status: SHIPPED | OUTPATIENT
Start: 2025-07-08

## 2025-08-24 DIAGNOSIS — M1A.9XX1 CHRONIC GOUT WITH TOPHUS, UNSPECIFIED CAUSE, UNSPECIFIED SITE: ICD-10-CM

## 2025-08-25 RX ORDER — ALLOPURINOL 300 MG/1
1.5 TABLET ORAL DAILY
Qty: 135 TABLET | Refills: 0 | Status: SHIPPED | OUTPATIENT
Start: 2025-08-25

## (undated) RX ORDER — FENTANYL CITRATE 50 UG/ML
INJECTION, SOLUTION INTRAMUSCULAR; INTRAVENOUS
Status: DISPENSED
Start: 2021-09-16